# Patient Record
Sex: FEMALE | Race: WHITE | NOT HISPANIC OR LATINO | Employment: FULL TIME | URBAN - METROPOLITAN AREA
[De-identification: names, ages, dates, MRNs, and addresses within clinical notes are randomized per-mention and may not be internally consistent; named-entity substitution may affect disease eponyms.]

---

## 2017-03-06 ENCOUNTER — OFFICE VISIT (OUTPATIENT)
Dept: FAMILY MEDICINE CLINIC | Facility: CLINIC | Age: 27
End: 2017-03-06

## 2017-03-06 VITALS
WEIGHT: 158 LBS | SYSTOLIC BLOOD PRESSURE: 120 MMHG | HEIGHT: 64 IN | HEART RATE: 72 BPM | DIASTOLIC BLOOD PRESSURE: 82 MMHG | OXYGEN SATURATION: 99 % | BODY MASS INDEX: 26.98 KG/M2

## 2017-03-06 DIAGNOSIS — I15.9 SECONDARY HYPERTENSION: ICD-10-CM

## 2017-03-06 DIAGNOSIS — R00.2 PALPITATIONS: Primary | ICD-10-CM

## 2017-03-06 DIAGNOSIS — Z00.00 HEALTHCARE MAINTENANCE: ICD-10-CM

## 2017-03-06 PROCEDURE — 99213 OFFICE O/P EST LOW 20 MIN: CPT | Performed by: FAMILY MEDICINE

## 2017-03-06 RX ORDER — METOPROLOL SUCCINATE 50 MG/1
50 TABLET, EXTENDED RELEASE ORAL DAILY
Qty: 90 TABLET | Refills: 3 | Status: SHIPPED | OUTPATIENT
Start: 2017-03-06 | End: 2018-04-10 | Stop reason: SDUPTHER

## 2017-03-06 NOTE — PROGRESS NOTES
Subjective   Page Austin is a 26 y.o. female.     Chief Complaint   Patient presents with   • Hypertension   • Med Refill     Social History   Substance Use Topics   • Smoking status: Never Smoker   • Smokeless tobacco: None   • Alcohol use Yes      Comment: 2/WEEK       History of Present Illness     Patient is here for follow-up of hypertension. She is not exercising and is adherent to a low-salt diet. Patient does check  her blood pressure on occ. It is well controlled . Patient denies chest pain and dyspnea. Cardiovascular risk factors: hypertension. Use of agents associated with hypertension: none. History of target organ damage: none. She is compliant with meds.      The following portions of the patient's history were reviewed and updated as appropriate: allergies, current medications, past social history and problem list.     Review of Systems   Constitutional: Negative for activity change, appetite change, chills, fatigue, fever and unexpected weight change.   HENT: Negative for congestion, ear pain, hearing loss, nosebleeds, rhinorrhea and sore throat.    Eyes: Negative for pain, redness and visual disturbance.   Respiratory: Negative for cough, shortness of breath and wheezing.    Cardiovascular: Negative for chest pain, palpitations and leg swelling.   Gastrointestinal: Negative for abdominal pain, blood in stool, constipation, diarrhea, nausea and vomiting.   Endocrine: Negative for cold intolerance and heat intolerance.   Genitourinary: Negative for difficulty urinating, dysuria, frequency, hematuria, pelvic pain, urgency and vaginal discharge.   Musculoskeletal: Negative for arthralgias, back pain and joint swelling.   Skin: Negative for rash and wound.   Neurological: Positive for headaches. Negative for dizziness, weakness and numbness.   Hematological: Does not bruise/bleed easily.   Psychiatric/Behavioral: Negative for dysphoric mood, sleep disturbance and suicidal ideas. The patient is not  "nervous/anxious.        Objective   Vitals:    03/06/17 1437   BP: 120/82   Pulse: 72   SpO2: 99%   Weight: 158 lb (71.7 kg)   Height: 64\" (162.6 cm)     Body mass index is 27.12 kg/(m^2).    Physical Exam   Constitutional: She is oriented to person, place, and time. Vital signs are normal. She appears well-developed and well-nourished. No distress.   HENT:   Head: Normocephalic.   Cardiovascular: Normal rate, regular rhythm and normal heart sounds.    Pulmonary/Chest: Effort normal and breath sounds normal.   Neurological: She is alert and oriented to person, place, and time. Gait normal.   Psychiatric: She has a normal mood and affect. Her behavior is normal. Judgment and thought content normal.   Vitals reviewed.      Assessment/Plan   Problem List Items Addressed This Visit        Cardiovascular and Mediastinum    Palpitations - Primary    Relevant Medications    metoprolol succinate XL (TOPROL-XL) 50 MG 24 hr tablet    Secondary hypertension    Relevant Medications    metoprolol succinate XL (TOPROL-XL) 50 MG 24 hr tablet      Other Visit Diagnoses     Healthcare maintenance        Relevant Orders    Comprehensive metabolic panel    Lipid Panel With LDL/HDL Ratio    CBC and Differential           "

## 2017-03-07 LAB
ALBUMIN SERPL-MCNC: 4 G/DL (ref 3.5–5.5)
ALBUMIN/GLOB SERPL: 1.5 {RATIO} (ref 1.1–2.5)
ALP SERPL-CCNC: 48 IU/L (ref 39–117)
ALT SERPL-CCNC: 19 IU/L (ref 0–32)
AST SERPL-CCNC: 17 IU/L (ref 0–40)
BASOPHILS # BLD AUTO: 0 X10E3/UL (ref 0–0.2)
BASOPHILS NFR BLD AUTO: 0 %
BILIRUB SERPL-MCNC: <0.2 MG/DL (ref 0–1.2)
BUN SERPL-MCNC: 12 MG/DL (ref 6–20)
BUN/CREAT SERPL: 15 (ref 8–20)
CALCIUM SERPL-MCNC: 8.9 MG/DL (ref 8.7–10.2)
CHLORIDE SERPL-SCNC: 103 MMOL/L (ref 96–106)
CHOLEST SERPL-MCNC: 166 MG/DL (ref 100–199)
CO2 SERPL-SCNC: 24 MMOL/L (ref 18–29)
CREAT SERPL-MCNC: 0.81 MG/DL (ref 0.57–1)
EOSINOPHIL # BLD AUTO: 0.2 X10E3/UL (ref 0–0.4)
EOSINOPHIL NFR BLD AUTO: 3 %
ERYTHROCYTE [DISTWIDTH] IN BLOOD BY AUTOMATED COUNT: 13.8 % (ref 12.3–15.4)
GLOBULIN SER CALC-MCNC: 2.7 G/DL (ref 1.5–4.5)
GLUCOSE SERPL-MCNC: 98 MG/DL (ref 65–99)
HCT VFR BLD AUTO: 38.7 % (ref 34–46.6)
HDLC SERPL-MCNC: 43 MG/DL
HGB BLD-MCNC: 12.5 G/DL (ref 11.1–15.9)
IMM GRANULOCYTES # BLD: 0 X10E3/UL (ref 0–0.1)
IMM GRANULOCYTES NFR BLD: 0 %
LDLC SERPL CALC-MCNC: 100 MG/DL (ref 0–99)
LDLC/HDLC SERPL: 2.3 RATIO UNITS (ref 0–3.2)
LYMPHOCYTES # BLD AUTO: 2.6 X10E3/UL (ref 0.7–3.1)
LYMPHOCYTES NFR BLD AUTO: 34 %
MCH RBC QN AUTO: 26.4 PG (ref 26.6–33)
MCHC RBC AUTO-ENTMCNC: 32.3 G/DL (ref 31.5–35.7)
MCV RBC AUTO: 82 FL (ref 79–97)
MONOCYTES # BLD AUTO: 0.7 X10E3/UL (ref 0.1–0.9)
MONOCYTES NFR BLD AUTO: 9 %
NEUTROPHILS # BLD AUTO: 4.1 X10E3/UL (ref 1.4–7)
NEUTROPHILS NFR BLD AUTO: 54 %
PLATELET # BLD AUTO: 281 X10E3/UL (ref 150–379)
POTASSIUM SERPL-SCNC: 4.3 MMOL/L (ref 3.5–5.2)
PROT SERPL-MCNC: 6.7 G/DL (ref 6–8.5)
RBC # BLD AUTO: 4.74 X10E6/UL (ref 3.77–5.28)
SODIUM SERPL-SCNC: 140 MMOL/L (ref 134–144)
TRIGL SERPL-MCNC: 114 MG/DL (ref 0–149)
VLDLC SERPL CALC-MCNC: 23 MG/DL (ref 5–40)
WBC # BLD AUTO: 7.7 X10E3/UL (ref 3.4–10.8)

## 2018-01-25 ENCOUNTER — TELEPHONE (OUTPATIENT)
Dept: FAMILY MEDICINE CLINIC | Facility: CLINIC | Age: 28
End: 2018-01-25

## 2018-01-25 NOTE — TELEPHONE ENCOUNTER
----- Message from Susana Ortiz MA sent at 1/25/2018  2:21 PM EST -----  Chioma Ocampo took this message I will find out what happened.   ----- Message -----     From: Bella Izaguirre     Sent: 1/25/2018  12:58 PM       To: Susana Gold MA    Patient left message last week re:  Possible conflict with blood pressure medication that she is on (toprol) and medication from dermatologist- Spironolactone    Please call [phone]

## 2018-01-25 NOTE — TELEPHONE ENCOUNTER
Patient contacted and made aware that I would ask Dr. Marroquin tomorrow when she gets back into the office. I apologized for not returning her call Friday when she called.     Patient stated that she was placed on Spironolactone by her dermatologist. She wants to make sure that this will not interact with her current medication.

## 2018-04-10 DIAGNOSIS — I15.9 SECONDARY HYPERTENSION: ICD-10-CM

## 2018-04-10 DIAGNOSIS — R00.2 PALPITATIONS: ICD-10-CM

## 2018-04-10 RX ORDER — METOPROLOL SUCCINATE 50 MG/1
50 TABLET, EXTENDED RELEASE ORAL DAILY
Qty: 30 TABLET | Refills: 0 | Status: SHIPPED | OUTPATIENT
Start: 2018-04-10 | End: 2018-05-08 | Stop reason: SDUPTHER

## 2018-05-08 DIAGNOSIS — I15.9 SECONDARY HYPERTENSION: ICD-10-CM

## 2018-05-08 DIAGNOSIS — R00.2 PALPITATIONS: ICD-10-CM

## 2018-05-08 RX ORDER — METOPROLOL SUCCINATE 50 MG/1
50 TABLET, EXTENDED RELEASE ORAL DAILY
Qty: 15 TABLET | Refills: 0 | Status: SHIPPED | OUTPATIENT
Start: 2018-05-08 | End: 2018-05-18 | Stop reason: SDUPTHER

## 2018-05-18 ENCOUNTER — OFFICE VISIT (OUTPATIENT)
Dept: FAMILY MEDICINE CLINIC | Facility: CLINIC | Age: 28
End: 2018-05-18

## 2018-05-18 VITALS
HEART RATE: 65 BPM | HEIGHT: 64 IN | BODY MASS INDEX: 26.8 KG/M2 | RESPIRATION RATE: 16 BRPM | WEIGHT: 157 LBS | DIASTOLIC BLOOD PRESSURE: 78 MMHG | SYSTOLIC BLOOD PRESSURE: 114 MMHG | OXYGEN SATURATION: 100 %

## 2018-05-18 DIAGNOSIS — R00.2 PALPITATIONS: ICD-10-CM

## 2018-05-18 DIAGNOSIS — I15.9 SECONDARY HYPERTENSION: ICD-10-CM

## 2018-05-18 PROCEDURE — 99212 OFFICE O/P EST SF 10 MIN: CPT | Performed by: FAMILY MEDICINE

## 2018-05-18 RX ORDER — METOPROLOL SUCCINATE 50 MG/1
50 TABLET, EXTENDED RELEASE ORAL DAILY
Qty: 90 TABLET | Refills: 3 | Status: SHIPPED | OUTPATIENT
Start: 2018-05-18 | End: 2019-05-10 | Stop reason: SDUPTHER

## 2018-05-18 NOTE — PROGRESS NOTES
Problem List Items Addressed This Visit        Cardiovascular and Mediastinum    Palpitations    Current Assessment & Plan     [facility] 5/18/2018  She never feels palpitations any more.          Relevant Medications    metoprolol succinate XL (TOPROL-XL) 50 MG 24 hr tablet    Secondary hypertension    Current Assessment & Plan     [facility]  BP is controlled well. She will recheck in six months. She will continue present meds.            Relevant Medications    metoprolol succinate XL (TOPROL-XL) 50 MG 24 hr tablet             No Follow-up on file.  There are no Patient Instructions on file for this visit.  Page Austin is a 28 y.o. female being seen in our office today for Hypertension                 She  reports that she has never smoked. She has never used smokeless tobacco. She reports that she drinks alcohol. She reports that she does not use drugs.             HPI Patient is here for follow-up of hypertension. She is exercising and is not adherent to a low-salt diet. Patient does check BP occasionally.. Patient denies chest pain and dyspnea. Cardiovascular risk factors: hypertension. Use of agents associated with hypertension: none. History of target organ damage: none. She is compliant with meds.              The following portions of the patient's history were reviewed and updated as appropriate:PMHroutine: Social history , Allergies, Current Medications, Active Problem List and Health Maintenance            Review of Systems   Constitutional: Negative for activity change, appetite change, chills, fatigue, fever and unexpected weight change.   HENT: Negative for congestion, ear pain, hearing loss, nosebleeds, rhinorrhea and sore throat.    Eyes: Negative for pain, redness and visual disturbance.   Respiratory: Negative for cough, shortness of breath and wheezing.    Cardiovascular: Negative for chest pain, palpitations and leg swelling.   Gastrointestinal: Negative for abdominal pain, blood  in stool, constipation, diarrhea, nausea and vomiting.   Endocrine: Negative for cold intolerance and heat intolerance.   Genitourinary: Negative for difficulty urinating, dysuria, frequency, hematuria, pelvic pain, urgency and vaginal discharge.   Musculoskeletal: Negative for arthralgias, back pain and joint swelling.   Skin: Negative for rash and wound.   Neurological: Negative for dizziness, weakness, numbness and headaches.   Hematological: Does not bruise/bleed easily.   Psychiatric/Behavioral: Negative for dysphoric mood, sleep disturbance and suicidal ideas. The patient is not nervous/anxious.                  BP Readings from Last 1 Encounters:   05/18/18 114/78     Wt Readings from Last 3 Encounters:   05/18/18 71.2 kg (157 lb)   10/27/17 72.6 kg (160 lb)   03/06/17 71.7 kg (158 lb)   Body mass index is 26.95 kg/m².                 Physical Exam   Constitutional: She is oriented to person, place, and time. Vital signs are normal. She appears well-developed and well-nourished. No distress.   HENT:   Head: Normocephalic.   Cardiovascular: Normal rate, regular rhythm and normal heart sounds.    Pulmonary/Chest: Effort normal and breath sounds normal.   Neurological: She is alert and oriented to person, place, and time. Gait normal.   Psychiatric: She has a normal mood and affect. Her behavior is normal. Judgment and thought content normal.   Vitals reviewed.

## 2018-05-18 NOTE — ASSESSMENT & PLAN NOTE
[facility] 5/18/2018  BP is controlled well. She will recheck in six months. She will continue present meds.

## 2019-05-10 ENCOUNTER — TELEPHONE (OUTPATIENT)
Dept: FAMILY MEDICINE CLINIC | Facility: CLINIC | Age: 29
End: 2019-05-10

## 2019-05-10 DIAGNOSIS — R00.2 PALPITATIONS: ICD-10-CM

## 2019-05-10 DIAGNOSIS — I15.9 SECONDARY HYPERTENSION: ICD-10-CM

## 2019-05-10 RX ORDER — METOPROLOL SUCCINATE 50 MG/1
TABLET, EXTENDED RELEASE ORAL
Qty: 30 TABLET | Refills: 0 | Status: SHIPPED | OUTPATIENT
Start: 2019-05-10 | End: 2019-05-20 | Stop reason: SDUPTHER

## 2019-05-20 ENCOUNTER — OFFICE VISIT (OUTPATIENT)
Dept: FAMILY MEDICINE CLINIC | Facility: CLINIC | Age: 29
End: 2019-05-20

## 2019-05-20 VITALS
HEART RATE: 74 BPM | SYSTOLIC BLOOD PRESSURE: 110 MMHG | HEIGHT: 64 IN | DIASTOLIC BLOOD PRESSURE: 70 MMHG | WEIGHT: 150.2 LBS | OXYGEN SATURATION: 99 % | BODY MASS INDEX: 25.64 KG/M2 | RESPIRATION RATE: 14 BRPM

## 2019-05-20 DIAGNOSIS — R00.2 PALPITATIONS: ICD-10-CM

## 2019-05-20 DIAGNOSIS — I15.9 SECONDARY HYPERTENSION: ICD-10-CM

## 2019-05-20 PROCEDURE — 99213 OFFICE O/P EST LOW 20 MIN: CPT | Performed by: FAMILY MEDICINE

## 2019-05-20 RX ORDER — METOPROLOL SUCCINATE 50 MG/1
50 TABLET, EXTENDED RELEASE ORAL DAILY
Qty: 90 TABLET | Refills: 1 | Status: SHIPPED | OUTPATIENT
Start: 2019-05-20 | End: 2019-12-15 | Stop reason: SDUPTHER

## 2019-05-20 NOTE — PROGRESS NOTES
Assessment and Plan  Problem List Items Addressed This Visit        Cardiovascular and Mediastinum    Palpitations    Relevant Medications    metoprolol succinate XL (TOPROL-XL) 50 MG 24 hr tablet      Other Visit Diagnoses     Secondary hypertension        Relevant Medications    metoprolol succinate XL (TOPROL-XL) 50 MG 24 hr tablet        F/U and Patient Instructions    Return in about 6 months (around 11/20/2019).      Subjective    Page Austin is a 29 y.o. female being seen in our office today for Hypertension     Patient History              Social History  She  reports that she has never smoked. She has never used smokeless tobacco. She reports that she drinks alcohol. She reports that she does not use drugs.             History of the Present Illness  HPI HPI Patient is here for follow-up of hypertension. She is exercising and is not adherent to a low-salt diet. Patient does check BP occasionally. Patient denies chest pain and dyspnea. Cardiovascular risk factors: hypertension. Use of agents associated with hypertension: none. History of target organ damage: none. She is compliant with meds. Her OBGYN wondered if the nuvaring was causing her issues with the BP. She has a strong FH of HTN and she has responded well to the metoprolol. I don't feel she needs to come of the nuva ring.   Significant Past History  The following portions of the patient's history were reviewed and updated as appropriate:PMHroutine: Social history , Allergies, Current Medications, Active Problem List and Health Maintenance          Review of Symptoms  Review of Systems   Constitutional: Negative for activity change, appetite change, chills, fatigue, fever and unexpected weight change.   HENT: Negative for congestion, ear pain, hearing loss, nosebleeds, rhinorrhea and sore throat.    Eyes: Negative for pain, redness and visual disturbance.   Respiratory: Negative for cough, shortness of breath and wheezing.    Cardiovascular:  Negative for chest pain, palpitations and leg swelling.   Gastrointestinal: Negative for abdominal pain, blood in stool, constipation, diarrhea, nausea and vomiting.   Endocrine: Negative for cold intolerance and heat intolerance.   Genitourinary: Negative for difficulty urinating, dysuria, frequency, hematuria, pelvic pain, urgency and vaginal discharge.   Musculoskeletal: Negative for arthralgias, back pain and joint swelling.   Skin: Negative for rash and wound.   Neurological: Negative for dizziness, weakness, numbness and headaches.   Hematological: Does not bruise/bleed easily.   Psychiatric/Behavioral: Negative for dysphoric mood, sleep disturbance and suicidal ideas. The patient is not nervous/anxious.      Objective  Vital Signs         BP Readings from Last 1 Encounters:   05/20/19 110/70     Wt Readings from Last 3 Encounters:   05/20/19 68.1 kg (150 lb 3.2 oz)   10/29/18 71.7 kg (158 lb)   05/18/18 71.2 kg (157 lb)   Body mass index is 25.78 kg/m².          Physical Exam   Physical Exam   Constitutional: She is oriented to person, place, and time. Vital signs are normal. She appears well-developed and well-nourished. No distress.   HENT:   Head: Normocephalic.   Cardiovascular: Normal rate, regular rhythm and normal heart sounds.   Pulmonary/Chest: Effort normal and breath sounds normal.   Neurological: She is alert and oriented to person, place, and time. Gait normal.   Psychiatric: She has a normal mood and affect. Her behavior is normal. Judgment and thought content normal.   Vitals reviewed.

## 2019-12-15 DIAGNOSIS — R00.2 PALPITATIONS: ICD-10-CM

## 2019-12-15 DIAGNOSIS — I15.9 SECONDARY HYPERTENSION: ICD-10-CM

## 2019-12-16 RX ORDER — METOPROLOL SUCCINATE 50 MG/1
TABLET, EXTENDED RELEASE ORAL
Qty: 30 TABLET | Refills: 0 | Status: SHIPPED | OUTPATIENT
Start: 2019-12-16 | End: 2019-12-30

## 2019-12-29 DIAGNOSIS — R00.2 PALPITATIONS: ICD-10-CM

## 2019-12-29 DIAGNOSIS — I15.9 SECONDARY HYPERTENSION: ICD-10-CM

## 2019-12-30 RX ORDER — METOPROLOL SUCCINATE 50 MG/1
TABLET, EXTENDED RELEASE ORAL
Qty: 90 TABLET | Refills: 0 | Status: SHIPPED | OUTPATIENT
Start: 2019-12-30 | End: 2020-02-26 | Stop reason: SDUPTHER

## 2020-01-06 ENCOUNTER — OFFICE VISIT (OUTPATIENT)
Dept: FAMILY MEDICINE CLINIC | Facility: CLINIC | Age: 30
End: 2020-01-06

## 2020-01-06 VITALS
HEART RATE: 70 BPM | RESPIRATION RATE: 14 BRPM | OXYGEN SATURATION: 96 % | SYSTOLIC BLOOD PRESSURE: 128 MMHG | DIASTOLIC BLOOD PRESSURE: 78 MMHG | BODY MASS INDEX: 25.9 KG/M2 | WEIGHT: 151.7 LBS | HEIGHT: 64 IN

## 2020-01-06 DIAGNOSIS — F41.9 ANXIETY: Primary | ICD-10-CM

## 2020-01-06 DIAGNOSIS — J39.2 THROAT DRY: ICD-10-CM

## 2020-01-06 PROCEDURE — 99213 OFFICE O/P EST LOW 20 MIN: CPT | Performed by: FAMILY MEDICINE

## 2020-01-06 NOTE — PROGRESS NOTES
ASSESSMENT AND PLAN    Problem List Items Addressed This Visit     None      Visit Diagnoses     Anxiety    -  Primary    Referral info given for CBT    Throat dry        Prob mechanical. Recommend increasing hydration. Let me know if persists.              Return for Next scheduled follow up for routine problems.  Patient was given instructions and counseling regarding her condition or for health maintenance advice. Please see specific information pulled into the AVS by me.          SUBJECTIVE  Page Austin is a 29 y.o. female being seen in our office today for Anxiety               Social History  She  reports that she has never smoked. She has never used smokeless tobacco. She reports that she drinks alcohol. She reports that she does not use drugs.    History of the Present Illness   HPI Anxiety: Patient complains of anxiety disorder.  She has the following symptoms: palpitations, racing thoughts, super organized but has trouble when she can't control the situation. . Onset of symptoms was most of the time    gradually worsening since that time. She denies current suicidal and homicidal ideation. Family history significant for anxiety.Possible organic causes contributing are: none. Risk factors: none Previous treatment includes none and none.  She complains of the following side effects from the treatment: none and NA.    Click sensation on right side of neck. Prob re to dryness.      Significant Past History  The following portions of the patient's history were reviewed and updated as appropriate:PMHroutine: Social history , Allergies, Current Medications, Active Problem List and Health Maintenance    Review of Systems   Constitutional: Negative for activity change, appetite change, chills, fatigue, fever and unexpected weight change.   HENT: Negative for congestion, ear pain, hearing loss, nosebleeds, rhinorrhea and sore throat.    Eyes: Negative for pain, redness and visual disturbance.    Respiratory: Negative for cough, shortness of breath and wheezing.    Cardiovascular: Negative for chest pain, palpitations and leg swelling.   Gastrointestinal: Negative for abdominal pain, blood in stool, constipation, diarrhea, nausea and vomiting.   Endocrine: Negative for cold intolerance and heat intolerance.   Genitourinary: Negative for difficulty urinating, dysuria, frequency, hematuria, pelvic pain, urgency and vaginal discharge.   Musculoskeletal: Negative for arthralgias, back pain and joint swelling.   Skin: Negative for rash and wound.   Neurological: Negative for dizziness, weakness, numbness and headaches.   Hematological: Does not bruise/bleed easily.   Psychiatric/Behavioral: Negative for dysphoric mood, sleep disturbance and suicidal ideas. The patient is nervous/anxious.    I have reviewed the ROS as documented by the MA. Lidia Marroquin MD      OBJECTIVE   Vital Signs          BP Readings from Last 1 Encounters:   01/06/20 128/78     Wt Readings from Last 3 Encounters:   01/06/20 68.8 kg (151 lb 11.2 oz)   05/20/19 68.1 kg (150 lb 3.2 oz)   10/29/18 71.7 kg (158 lb)   Body mass index is 26.04 kg/m².     Physical Exam   Constitutional: She appears well-developed and well-nourished.   HENT:   Mouth/Throat: Uvula is midline, oropharynx is clear and moist and mucous membranes are normal.   Could feel a vibratory click on right but no adenopathy. No pain. Is really dry.    Psychiatric: She has a normal mood and affect. Her behavior is normal. Judgment and thought content normal.   Vitals reviewed.    Data Reviewed

## 2020-02-26 DIAGNOSIS — I15.9 SECONDARY HYPERTENSION: ICD-10-CM

## 2020-02-26 DIAGNOSIS — R00.2 PALPITATIONS: ICD-10-CM

## 2020-02-26 RX ORDER — METOPROLOL SUCCINATE 50 MG/1
50 TABLET, EXTENDED RELEASE ORAL DAILY
Qty: 90 TABLET | Refills: 1 | Status: SHIPPED | OUTPATIENT
Start: 2020-02-26 | End: 2020-09-09

## 2020-02-26 NOTE — TELEPHONE ENCOUNTER
PT CALLED TO GET MED REFILLS OF metoprolol succinate XL (TOPROL-XL) 50 MG 24 hr tablet [[id number]]    PHARMACY IS [address]    CB#: [phone]

## 2020-09-08 DIAGNOSIS — I15.9 SECONDARY HYPERTENSION: ICD-10-CM

## 2020-09-08 DIAGNOSIS — R00.2 PALPITATIONS: ICD-10-CM

## 2020-09-09 RX ORDER — METOPROLOL SUCCINATE 50 MG/1
TABLET, EXTENDED RELEASE ORAL
Qty: 90 TABLET | Refills: 0 | Status: SHIPPED | OUTPATIENT
Start: 2020-09-09 | End: 2020-11-30

## 2020-11-29 DIAGNOSIS — R00.2 PALPITATIONS: ICD-10-CM

## 2020-11-29 DIAGNOSIS — I15.9 SECONDARY HYPERTENSION: ICD-10-CM

## 2020-11-30 RX ORDER — METOPROLOL SUCCINATE 50 MG/1
TABLET, EXTENDED RELEASE ORAL
Qty: 30 TABLET | Refills: 0 | Status: SHIPPED | OUTPATIENT
Start: 2020-11-30 | End: 2020-12-18 | Stop reason: SDUPTHER

## 2020-12-18 ENCOUNTER — OFFICE VISIT (OUTPATIENT)
Dept: FAMILY MEDICINE CLINIC | Facility: CLINIC | Age: 30
End: 2020-12-18

## 2020-12-18 VITALS
HEIGHT: 64 IN | BODY MASS INDEX: 26.63 KG/M2 | WEIGHT: 156 LBS | SYSTOLIC BLOOD PRESSURE: 106 MMHG | RESPIRATION RATE: 14 BRPM | DIASTOLIC BLOOD PRESSURE: 70 MMHG

## 2020-12-18 DIAGNOSIS — R00.2 PALPITATIONS: ICD-10-CM

## 2020-12-18 DIAGNOSIS — I15.9 SECONDARY HYPERTENSION: ICD-10-CM

## 2020-12-18 PROCEDURE — 99395 PREV VISIT EST AGE 18-39: CPT | Performed by: FAMILY MEDICINE

## 2020-12-18 RX ORDER — TRIAMCINOLONE ACETONIDE 1 MG/G
CREAM TOPICAL
COMMUNITY
Start: 2020-11-10 | End: 2020-12-18

## 2020-12-18 RX ORDER — METOPROLOL SUCCINATE 50 MG/1
50 TABLET, EXTENDED RELEASE ORAL DAILY
Qty: 90 TABLET | Refills: 1 | Status: SHIPPED | OUTPATIENT
Start: 2020-12-18 | End: 2021-06-22

## 2020-12-18 RX ORDER — NORETHINDRONE 0.35 MG/1
1 TABLET ORAL DAILY
COMMUNITY
Start: 2020-11-29

## 2020-12-18 NOTE — PROGRESS NOTES
Assessment and Plan:     Problem List Items Addressed This Visit     Palpitations    Relevant Medications    metoprolol succinate XL (TOPROL-XL) 50 MG 24 hr tablet      Other Visit Diagnoses     Secondary hypertension        Relevant Medications    metoprolol succinate XL (TOPROL-XL) 50 MG 24 hr tablet        Return in about 1 year (around 12/18/2021) for lab with next visit, Annual physical.  Patient was given instructions and counseling regarding her condition or for health maintenance advice. Please see specific information pulled into the AVS if appropriate.        Page Austin is a 30 y.o. female being seen today for Annual Exam   Subjective   History of the Present Illness   Annual Exam-Premenopausal:    Page Austin 30 y.o.female presents for annual exam.    Health Habits:  Dental Exam. up to date  Exercise: 5 times/week.  Current exercise activities include: yoga and BARRE  She is eating a healthy diet.   The patient does wear seatbelts.  She is wearing sunscreen.  Practicing social distancing, handwashing and keeping hands from face? yes  Last pap  approximate date 2019 and was normal   BCM -- OCP  Vaccines up to date  Labs results were within the five years three years ago   Social History  She  reports that she has never smoked. She has never used smokeless tobacco. She reports current alcohol use. She reports that she does not use drugs.  Review of Systems   Constitutional: Negative for activity change, appetite change, chills, fatigue, fever and unexpected weight change.   HENT: Negative for congestion, ear pain, hearing loss, nosebleeds, rhinorrhea and sore throat.    Eyes: Negative for pain, redness and visual disturbance.   Respiratory: Negative for cough, shortness of breath and wheezing.    Cardiovascular: Negative for chest pain, palpitations and leg swelling.   Gastrointestinal: Negative for abdominal pain, blood in stool, constipation, diarrhea, nausea and vomiting.   Endocrine: Negative  for cold intolerance and heat intolerance.   Genitourinary: Negative for difficulty urinating, dysuria, frequency, hematuria, pelvic pain, urgency and vaginal discharge.   Musculoskeletal: Negative for arthralgias, back pain and joint swelling.   Skin: Negative for rash and wound.   Neurological: Negative for dizziness, weakness, numbness and headaches.   Hematological: Does not bruise/bleed easily.   Psychiatric/Behavioral: Negative for dysphoric mood, sleep disturbance and suicidal ideas. The patient is not nervous/anxious.    I have reviewed the ROS as documented by the MA. Lidia Marroquin MD    Objective   Vital Signs          BP Readings from Last 1 Encounters:   12/18/20 106/70     Wt Readings from Last 3 Encounters:   12/18/20 70.8 kg (156 lb)   02/20/20 70.3 kg (155 lb)   01/06/20 68.8 kg (151 lb 11.2 oz)   Body mass index is 26.78 kg/m².     Physical Exam  Vitals signs reviewed.   Constitutional:       General: She is not in acute distress.     Appearance: She is well-developed.   HENT:      Head: Normocephalic and atraumatic.      Right Ear: Tympanic membrane, ear canal and external ear normal.      Left Ear: Tympanic membrane, ear canal and external ear normal.      Mouth/Throat:      Comments: Mask in place  Eyes:      Conjunctiva/sclera: Conjunctivae normal.   Neck:      Musculoskeletal: Normal range of motion and neck supple.      Thyroid: No thyromegaly.      Trachea: No tracheal deviation.   Cardiovascular:      Rate and Rhythm: Normal rate and regular rhythm.      Heart sounds: Normal heart sounds.   Pulmonary:      Effort: Pulmonary effort is normal. No respiratory distress.      Breath sounds: Normal breath sounds. No wheezing or rales.   Chest:      Breasts: Breasts are symmetrical.         Right: No mass.         Left: No mass.   Abdominal:      General: Bowel sounds are normal. There is no distension.      Palpations: Abdomen is soft.      Tenderness: There is no abdominal tenderness.    Musculoskeletal:         General: No deformity.      Right lower leg: No edema.      Left lower leg: No edema.   Lymphadenopathy:      Cervical: No cervical adenopathy.   Skin:     General: Skin is warm and dry.   Neurological:      Mental Status: She is alert and oriented to person, place, and time.   Psychiatric:         Behavior: Behavior normal.         Thought Content: Thought content normal.         Judgment: Judgment normal.

## 2021-04-16 ENCOUNTER — BULK ORDERING (OUTPATIENT)
Dept: CASE MANAGEMENT | Facility: OTHER | Age: 31
End: 2021-04-16

## 2021-04-16 DIAGNOSIS — Z23 IMMUNIZATION DUE: ICD-10-CM

## 2021-06-22 DIAGNOSIS — R00.2 PALPITATIONS: ICD-10-CM

## 2021-06-22 DIAGNOSIS — I15.9 SECONDARY HYPERTENSION: ICD-10-CM

## 2021-06-22 RX ORDER — METOPROLOL SUCCINATE 50 MG/1
TABLET, EXTENDED RELEASE ORAL
Qty: 30 TABLET | Refills: 0 | Status: SHIPPED | OUTPATIENT
Start: 2021-06-22 | End: 2021-07-15

## 2021-07-14 DIAGNOSIS — I15.9 SECONDARY HYPERTENSION: ICD-10-CM

## 2021-07-14 DIAGNOSIS — R00.2 PALPITATIONS: ICD-10-CM

## 2021-07-15 RX ORDER — METOPROLOL SUCCINATE 50 MG/1
TABLET, EXTENDED RELEASE ORAL
Qty: 30 TABLET | Refills: 0 | Status: SHIPPED | OUTPATIENT
Start: 2021-07-15 | End: 2021-07-19 | Stop reason: SDUPTHER

## 2021-07-19 DIAGNOSIS — R00.2 PALPITATIONS: ICD-10-CM

## 2021-07-19 DIAGNOSIS — I15.9 SECONDARY HYPERTENSION: ICD-10-CM

## 2021-07-19 NOTE — TELEPHONE ENCOUNTER
Caller: Page Austin    Relationship: Self    Best call back number: [phone] (H)    What is the best time to reach you: ANYTIME    Who are you requesting to speak with (clinical staff, provider,  specific staff member): CLINICAL STAFF    Do you know the name of the person who called:     What was the call regarding: BETA BLOCKER. PATIENT CALLED TO ADVISE THAT HER PHARMACY IS OUT OF HER BETA BLOCKER AND SHE IS REQUESTING A CALLBACK TO DISCUSS WHAT SHE SHOULD DO NEXT, SHE STATES THAT SHE CAN NOT JUST STOP THIS MEDICATION     Do you require a callback: YES        THANKS

## 2021-07-19 NOTE — TELEPHONE ENCOUNTER
Caller: Page Austin    Relationship: Self    Best call back number: [phone]    Medication needed:   Requested Prescriptions     Pending Prescriptions Disp Refills   • metoprolol succinate XL (TOPROL-XL) 50 MG 24 hr tablet 30 tablet 0     Sig: Take 1 tablet by mouth Daily.       When do you need the refill by: ASAP    What additional details did the patient provide when requesting the medication: PATIENT CALLED PHARMACY BACK. THEY STATED THEY HAVE THE MEDICINE, THEY JUST NEED A PRESCRIPTION FOR 90 DAYS IN ORDER FOR INSURANCE TO COVER.     Does the patient have less than a 3 day supply:  [x] Yes  [] No    What is the patient's preferred pharmacy:  [facility]/pharmacy #[id number] [place] [address] [phone] [facility] [phone]

## 2021-07-19 NOTE — TELEPHONE ENCOUNTER
Patient will call other pharmacy's to find one that has med and call back to let us know where to send

## 2021-07-20 RX ORDER — METOPROLOL SUCCINATE 50 MG/1
50 TABLET, EXTENDED RELEASE ORAL DAILY
Qty: 90 TABLET | Refills: 1 | Status: SHIPPED | OUTPATIENT
Start: 2021-07-20 | End: 2021-12-22 | Stop reason: SDUPTHER

## 2021-10-13 ENCOUNTER — FLU SHOT (OUTPATIENT)
Dept: FAMILY MEDICINE CLINIC | Facility: CLINIC | Age: 31
End: 2021-10-13

## 2021-10-13 DIAGNOSIS — Z23 NEED FOR INFLUENZA VACCINATION: Primary | ICD-10-CM

## 2021-10-13 PROCEDURE — 90686 IIV4 VACC NO PRSV 0.5 ML IM: CPT | Performed by: FAMILY MEDICINE

## 2021-10-13 PROCEDURE — 90471 IMMUNIZATION ADMIN: CPT | Performed by: FAMILY MEDICINE

## 2021-12-21 RX ORDER — SPIRONOLACTONE 25 MG/1
TABLET ORAL
COMMUNITY
Start: 2021-12-13 | End: 2022-12-30

## 2021-12-21 RX ORDER — CLINDAMYCIN PHOSPHATE AND BENZOYL PEROXIDE 10; 37.5 MG/G; MG/G
GEL TOPICAL
COMMUNITY
Start: 2021-09-17

## 2021-12-21 NOTE — PROGRESS NOTES
Assessment and Plan     Problem List Items Addressed This Visit        Cardiac and Vasculature    Palpitations    Relevant Medications    metoprolol succinate XL (TOPROL-XL) 50 MG 24 hr tablet      Other Visit Diagnoses     Healthcare maintenance    -  Primary    Relevant Orders    Hepatitis C Antibody    CBC (No Diff)    Comprehensive Metabolic Panel    Lipid Panel With LDL / HDL Ratio    Secondary hypertension        Relevant Medications    spironolactone (ALDACTONE) 25 MG tablet    metoprolol succinate XL (TOPROL-XL) 50 MG 24 hr tablet        No follow-ups on file.    Patient was given instructions and counseling regarding her condition or for health maintenance advice. Please see specific information pulled into the AVS if appropriate.        Page is a 31 y.o. being seen today for  Annual Exam   Subjective   History of the Present Illness   Annual Exam-Premenopausal:    Page Austin 31 y.o.female presents for annual exam.    Health Habits:  Dental Exam. up to date  Exercise: 4 times/week.  Current exercise activities include: beach  body, dance three times with weights  She is eating a healthy diet.   The patient does wear seatbelts.  She is wearing sunscreen.  Practicing social distancing, handwashing and keeping hands from face? yes  COVID vaccine UTD? yes  Last pap  approximate date 5/21 and was normal   BCM -- OCP  Vaccines up to date  Labs results were ordered  Social History  She  reports that she has never smoked. She has never used smokeless tobacco. She reports current alcohol use. She reports that she does not use drugs.  Objective   Vital Signs        BP Readings from Last 1 Encounters:   12/22/21 104/68     Wt Readings from Last 3 Encounters:   12/22/21 77.1 kg (170 lb)   12/18/20 70.8 kg (156 lb)   02/20/20 70.3 kg (155 lb)   Body mass index is 29.18 kg/m².     Physical Exam  Vitals reviewed.   Constitutional:       General: She is not in acute distress.     Appearance: She is well-developed.    HENT:      Head: Normocephalic and atraumatic.      Right Ear: Tympanic membrane, ear canal and external ear normal.      Left Ear: Tympanic membrane, ear canal and external ear normal.   Eyes:      Conjunctiva/sclera: Conjunctivae normal.   Neck:      Thyroid: No thyromegaly.      Trachea: No tracheal deviation.   Cardiovascular:      Rate and Rhythm: Normal rate and regular rhythm.      Heart sounds: Normal heart sounds.   Pulmonary:      Effort: Pulmonary effort is normal. No respiratory distress.      Breath sounds: Normal breath sounds. No wheezing or rales.   Abdominal:      General: Bowel sounds are normal. There is no distension.      Palpations: Abdomen is soft.      Tenderness: There is no abdominal tenderness.   Musculoskeletal:         General: No deformity.      Cervical back: Normal range of motion and neck supple.   Lymphadenopathy:      Cervical: No cervical adenopathy.   Skin:     General: Skin is warm and dry.   Neurological:      Mental Status: She is alert and oriented to person, place, and time.   Psychiatric:         Behavior: Behavior normal.         Thought Content: Thought content normal.         Judgment: Judgment normal.

## 2021-12-22 ENCOUNTER — OFFICE VISIT (OUTPATIENT)
Dept: FAMILY MEDICINE CLINIC | Facility: CLINIC | Age: 31
End: 2021-12-22

## 2021-12-22 VITALS
HEIGHT: 64 IN | HEART RATE: 72 BPM | SYSTOLIC BLOOD PRESSURE: 104 MMHG | WEIGHT: 170 LBS | OXYGEN SATURATION: 99 % | RESPIRATION RATE: 16 BRPM | DIASTOLIC BLOOD PRESSURE: 68 MMHG | BODY MASS INDEX: 29.02 KG/M2

## 2021-12-22 DIAGNOSIS — R00.2 PALPITATIONS: ICD-10-CM

## 2021-12-22 DIAGNOSIS — Z00.00 HEALTHCARE MAINTENANCE: Primary | ICD-10-CM

## 2021-12-22 DIAGNOSIS — I15.9 SECONDARY HYPERTENSION: ICD-10-CM

## 2021-12-22 PROCEDURE — 99395 PREV VISIT EST AGE 18-39: CPT | Performed by: FAMILY MEDICINE

## 2021-12-22 RX ORDER — METOPROLOL SUCCINATE 50 MG/1
50 TABLET, EXTENDED RELEASE ORAL DAILY
Qty: 90 TABLET | Refills: 3 | Status: SHIPPED | OUTPATIENT
Start: 2021-12-22 | End: 2022-12-30 | Stop reason: SDUPTHER

## 2021-12-22 RX ORDER — TRETINOIN 0.5 MG/G
CREAM TOPICAL
COMMUNITY
Start: 2021-07-02

## 2021-12-23 LAB
ALBUMIN SERPL-MCNC: 4.6 G/DL (ref 3.8–4.8)
ALBUMIN/GLOB SERPL: 2.1 {RATIO} (ref 1.2–2.2)
ALP SERPL-CCNC: 63 IU/L (ref 44–121)
ALT SERPL-CCNC: 16 IU/L (ref 0–32)
AST SERPL-CCNC: 15 IU/L (ref 0–40)
BILIRUB SERPL-MCNC: 0.2 MG/DL (ref 0–1.2)
BUN SERPL-MCNC: 12 MG/DL (ref 6–20)
BUN/CREAT SERPL: 15 (ref 9–23)
CALCIUM SERPL-MCNC: 9.7 MG/DL (ref 8.7–10.2)
CHLORIDE SERPL-SCNC: 102 MMOL/L (ref 96–106)
CHOLEST SERPL-MCNC: 174 MG/DL (ref 100–199)
CO2 SERPL-SCNC: 25 MMOL/L (ref 20–29)
CREAT SERPL-MCNC: 0.78 MG/DL (ref 0.57–1)
ERYTHROCYTE [DISTWIDTH] IN BLOOD BY AUTOMATED COUNT: 13 % (ref 11.7–15.4)
GLOBULIN SER CALC-MCNC: 2.2 G/DL (ref 1.5–4.5)
GLUCOSE SERPL-MCNC: 84 MG/DL (ref 65–99)
HCT VFR BLD AUTO: 42 % (ref 34–46.6)
HCV AB S/CO SERPL IA: <0.1 S/CO RATIO (ref 0–0.9)
HDLC SERPL-MCNC: 34 MG/DL
HGB BLD-MCNC: 14.3 G/DL (ref 11.1–15.9)
LDLC SERPL CALC-MCNC: 100 MG/DL (ref 0–99)
LDLC/HDLC SERPL: 2.9 RATIO (ref 0–3.2)
MCH RBC QN AUTO: 28.7 PG (ref 26.6–33)
MCHC RBC AUTO-ENTMCNC: 34 G/DL (ref 31.5–35.7)
MCV RBC AUTO: 84 FL (ref 79–97)
PLATELET # BLD AUTO: 300 X10E3/UL (ref 150–450)
POTASSIUM SERPL-SCNC: 4.5 MMOL/L (ref 3.5–5.2)
PROT SERPL-MCNC: 6.8 G/DL (ref 6–8.5)
RBC # BLD AUTO: 4.98 X10E6/UL (ref 3.77–5.28)
SODIUM SERPL-SCNC: 138 MMOL/L (ref 134–144)
TRIGL SERPL-MCNC: 230 MG/DL (ref 0–149)
VLDLC SERPL CALC-MCNC: 40 MG/DL (ref 5–40)
WBC # BLD AUTO: 8.2 X10E3/UL (ref 3.4–10.8)

## 2022-12-29 NOTE — PROGRESS NOTES
Assessment and Plan     Patient Instructions    Problem List Items Addressed This Visit        Cardiac and Vasculature    Benign essential hypertension       Endocrine and Metabolic    Class 1 obesity due to excess calories with body mass index (BMI) of 31.0 to 31.9 in adult    Current Assessment & Plan     Patient's (Body mass index is 31.07 kg/m².) indicates that they are obese (BMI >30) with health conditions that include hypertension . Weight is newly identified. BMI is is above average; BMI management plan is completed. We discussed portion control and increasing exercise. Also discussed reducing processed foods and interval training.         Other Visit Diagnoses     Healthcare maintenance    -  Primary             Return in about 6 months (around 6/30/2023).          Page is a 32 y.o. being seen today for  Annual Exam   Subjective   History of the Present Illness   Annual Exam-Premenopausal:    Page Austin 32 y.o.female presents for annual exam.    Health Habits:  Dental Exam. up to date  Are you exercising and if so how much every 2 days  She is eating a healthy diet.   The patient does wear seatbelts.  She is wearing sunscreen.  Last pap  approximate date 5/21 and was normal   BCM -- OCP  Vaccines up to date  This patient has ever been tested for HepC: yes  Labs results were ordered  Social History  She  reports that she has never smoked. She has never used smokeless tobacco. She reports current alcohol use of about 2.0 standard drinks per week. She reports that she does not use drugs.  Objective   Vital Signs        BP Readings from Last 1 Encounters:   12/30/22 106/62     Wt Readings from Last 3 Encounters:   12/30/22 82.1 kg (181 lb)   12/22/21 77.1 kg (170 lb)   12/18/20 70.8 kg (156 lb)   Body mass index is 31.07 kg/m².     Physical Exam  Vitals reviewed.   Constitutional:       Appearance: Normal appearance. She is well-developed and normal weight.      Comments: Mask in place     Cardiovascular:      Rate and Rhythm: Normal rate and regular rhythm.      Heart sounds: Normal heart sounds.   Pulmonary:      Effort: Pulmonary effort is normal.      Breath sounds: Normal breath sounds.   Neurological:      Mental Status: She is alert.   Psychiatric:         Behavior: Behavior normal.         Thought Content: Thought content normal.         Judgment: Judgment normal.               Patient was given instructions and counseling regarding her condition or for health maintenance advice. Please see specific information pulled into the AVS if appropriate.

## 2022-12-30 ENCOUNTER — OFFICE VISIT (OUTPATIENT)
Dept: FAMILY MEDICINE CLINIC | Facility: CLINIC | Age: 32
End: 2022-12-30

## 2022-12-30 VITALS
WEIGHT: 181 LBS | RESPIRATION RATE: 17 BRPM | OXYGEN SATURATION: 98 % | BODY MASS INDEX: 31.07 KG/M2 | HEART RATE: 70 BPM | SYSTOLIC BLOOD PRESSURE: 106 MMHG | DIASTOLIC BLOOD PRESSURE: 62 MMHG

## 2022-12-30 DIAGNOSIS — R00.2 PALPITATIONS: ICD-10-CM

## 2022-12-30 DIAGNOSIS — I10 BENIGN ESSENTIAL HYPERTENSION: ICD-10-CM

## 2022-12-30 DIAGNOSIS — I15.9 SECONDARY HYPERTENSION: ICD-10-CM

## 2022-12-30 DIAGNOSIS — E66.09 CLASS 1 OBESITY DUE TO EXCESS CALORIES WITHOUT SERIOUS COMORBIDITY WITH BODY MASS INDEX (BMI) OF 31.0 TO 31.9 IN ADULT: ICD-10-CM

## 2022-12-30 DIAGNOSIS — Z00.00 HEALTHCARE MAINTENANCE: Primary | ICD-10-CM

## 2022-12-30 PROBLEM — E66.811 CLASS 1 OBESITY DUE TO EXCESS CALORIES WITH BODY MASS INDEX (BMI) OF 31.0 TO 31.9 IN ADULT: Status: ACTIVE | Noted: 2022-12-30

## 2022-12-30 PROCEDURE — 99395 PREV VISIT EST AGE 18-39: CPT | Performed by: FAMILY MEDICINE

## 2022-12-30 RX ORDER — AZELAIC ACID 0.15 G/G
GEL TOPICAL
COMMUNITY
Start: 2022-11-28

## 2022-12-30 RX ORDER — METOPROLOL SUCCINATE 50 MG/1
TABLET, EXTENDED RELEASE ORAL
Qty: 90 TABLET | Refills: 1 | OUTPATIENT
Start: 2022-12-30

## 2022-12-30 RX ORDER — PNV NO.95/FERROUS FUM/FOLIC AC 28MG-0.8MG
TABLET ORAL
COMMUNITY
Start: 2022-06-28

## 2022-12-30 RX ORDER — METOPROLOL SUCCINATE 50 MG/1
50 TABLET, EXTENDED RELEASE ORAL DAILY
Qty: 90 TABLET | Refills: 3 | Status: SHIPPED | OUTPATIENT
Start: 2022-12-30

## 2022-12-30 NOTE — ASSESSMENT & PLAN NOTE
Patient's (Body mass index is 31.07 kg/m².) indicates that they are obese (BMI >30) with health conditions that include hypertension . Weight is newly identified. BMI is is above average; BMI management plan is completed. We discussed portion control and increasing exercise. Also discussed reducing processed foods and interval training.

## 2022-12-31 LAB
ALBUMIN SERPL-MCNC: 4.4 G/DL (ref 3.5–5.2)
ALBUMIN/GLOB SERPL: 1.8 G/DL
ALP SERPL-CCNC: 55 U/L (ref 39–117)
ALT SERPL-CCNC: 15 U/L (ref 1–33)
AST SERPL-CCNC: 13 U/L (ref 1–32)
BILIRUB SERPL-MCNC: 0.3 MG/DL (ref 0–1.2)
BUN SERPL-MCNC: 11 MG/DL (ref 6–20)
BUN/CREAT SERPL: 12.9 (ref 7–25)
CALCIUM SERPL-MCNC: 9.3 MG/DL (ref 8.6–10.5)
CHLORIDE SERPL-SCNC: 102 MMOL/L (ref 98–107)
CHOLEST SERPL-MCNC: 161 MG/DL (ref 0–200)
CO2 SERPL-SCNC: 29.6 MMOL/L (ref 22–29)
CREAT SERPL-MCNC: 0.85 MG/DL (ref 0.57–1)
EGFRCR SERPLBLD CKD-EPI 2021: 92.9 ML/MIN/1.73
ERYTHROCYTE [DISTWIDTH] IN BLOOD BY AUTOMATED COUNT: 12.7 % (ref 12.3–15.4)
GLOBULIN SER CALC-MCNC: 2.4 GM/DL
GLUCOSE SERPL-MCNC: 87 MG/DL (ref 65–99)
HCT VFR BLD AUTO: 42.6 % (ref 34–46.6)
HDLC SERPL-MCNC: 31 MG/DL (ref 40–60)
HGB BLD-MCNC: 14.2 G/DL (ref 12–15.9)
LDLC SERPL CALC-MCNC: 104 MG/DL (ref 0–100)
LDLC/HDLC SERPL: 3.26 {RATIO}
MCH RBC QN AUTO: 29 PG (ref 26.6–33)
MCHC RBC AUTO-ENTMCNC: 33.3 G/DL (ref 31.5–35.7)
MCV RBC AUTO: 86.9 FL (ref 79–97)
PLATELET # BLD AUTO: 235 10*3/MM3 (ref 140–450)
POTASSIUM SERPL-SCNC: 3.8 MMOL/L (ref 3.5–5.2)
PROT SERPL-MCNC: 6.8 G/DL (ref 6–8.5)
RBC # BLD AUTO: 4.9 10*6/MM3 (ref 3.77–5.28)
SODIUM SERPL-SCNC: 138 MMOL/L (ref 136–145)
TRIGL SERPL-MCNC: 144 MG/DL (ref 0–150)
TSH SERPL DL<=0.005 MIU/L-ACNC: 2.11 UIU/ML (ref 0.27–4.2)
VLDLC SERPL CALC-MCNC: 26 MG/DL (ref 5–40)
WBC # BLD AUTO: 7.07 10*3/MM3 (ref 3.4–10.8)

## 2023-12-16 DIAGNOSIS — R00.2 PALPITATIONS: ICD-10-CM

## 2023-12-16 DIAGNOSIS — I15.9 SECONDARY HYPERTENSION: ICD-10-CM

## 2023-12-18 RX ORDER — METOPROLOL SUCCINATE 50 MG/1
50 TABLET, EXTENDED RELEASE ORAL DAILY
Qty: 15 TABLET | Refills: 0 | Status: SHIPPED | OUTPATIENT
Start: 2023-12-18

## 2024-01-08 ENCOUNTER — OFFICE VISIT (OUTPATIENT)
Dept: FAMILY MEDICINE CLINIC | Facility: CLINIC | Age: 34
End: 2024-01-08
Payer: COMMERCIAL

## 2024-01-08 VITALS
WEIGHT: 180 LBS | HEART RATE: 60 BPM | RESPIRATION RATE: 18 BRPM | OXYGEN SATURATION: 99 % | BODY MASS INDEX: 30.73 KG/M2 | DIASTOLIC BLOOD PRESSURE: 78 MMHG | SYSTOLIC BLOOD PRESSURE: 128 MMHG | HEIGHT: 64 IN

## 2024-01-08 DIAGNOSIS — I10 BENIGN ESSENTIAL HYPERTENSION: Primary | ICD-10-CM

## 2024-01-08 DIAGNOSIS — R00.2 PALPITATIONS: ICD-10-CM

## 2024-01-08 PROCEDURE — 99213 OFFICE O/P EST LOW 20 MIN: CPT | Performed by: FAMILY MEDICINE

## 2024-01-08 RX ORDER — METOPROLOL SUCCINATE 50 MG/1
50 TABLET, EXTENDED RELEASE ORAL DAILY
Qty: 90 TABLET | Refills: 1 | Status: SHIPPED | OUTPATIENT
Start: 2024-01-08

## 2024-01-08 NOTE — PROGRESS NOTES
Assessment and Plan     Assessment & Plan  Benign essential hypertension  Bakari 1/8/2024  BP is controlled well. She will recheck in six months. She will continue present meds. Prescription is sent today. .    Palpitations  controlled     New Medications Ordered This Visit   Medications    metoprolol succinate XL (TOPROL-XL) 50 MG 24 hr tablet     Sig: Take 1 tablet by mouth Daily.     Dispense:  90 tablet     Refill:  1       Return for Annual physical.  Patient was given instructions and counseling regarding her condition or for health maintenance advice. Please see specific information pulled into the AVS if appropriate.         Page is a 33 y.o. being seen today for  Hypertension   HISTORY    HPI  Answers submitted by the patient for this visit:  Other (Submitted on 1/5/2024)  Please describe your symptoms.: None- prescription refill for hypertension and palpatations  Have you had these symptoms before?: No  How long have you been having these symptoms?: 1-4 days  Please list any medications you are currently taking for this condition.: Metoprolol    Social History  She  reports that she has never smoked. She has never used smokeless tobacco. She reports current alcohol use of about 2.0 standard drinks of alcohol per week. She reports that she does not use drugs.  EXAM DATA    Vital Signs        BP Readings from Last 1 Encounters:   01/08/24 128/78     Wt Readings from Last 3 Encounters:   01/08/24 81.6 kg (180 lb)   12/30/22 82.1 kg (181 lb)   12/22/21 77.1 kg (170 lb)   Body mass index is 30.9 kg/m².  Physical Exam  Vitals reviewed.   Constitutional:       Appearance: Normal appearance. She is well-developed.   Neurological:      Mental Status: She is alert.   Psychiatric:         Behavior: Behavior normal.         Thought Content: Thought content normal.         Judgment: Judgment normal.

## 2024-01-08 NOTE — ASSESSMENT & PLAN NOTE
Bakari 1/8/2024  BP is controlled well. She will recheck in six months. She will continue present meds. Prescription is sent today. .

## 2024-05-20 ENCOUNTER — OFFICE VISIT (OUTPATIENT)
Dept: FAMILY MEDICINE CLINIC | Facility: CLINIC | Age: 34
End: 2024-05-20
Payer: COMMERCIAL

## 2024-05-20 VITALS
RESPIRATION RATE: 18 BRPM | SYSTOLIC BLOOD PRESSURE: 140 MMHG | HEART RATE: 69 BPM | BODY MASS INDEX: 30.73 KG/M2 | OXYGEN SATURATION: 98 % | HEIGHT: 64 IN | WEIGHT: 180 LBS | DIASTOLIC BLOOD PRESSURE: 80 MMHG

## 2024-05-20 DIAGNOSIS — M54.50 ACUTE BILATERAL LOW BACK PAIN WITHOUT SCIATICA: ICD-10-CM

## 2024-05-20 DIAGNOSIS — R00.2 PALPITATIONS: ICD-10-CM

## 2024-05-20 DIAGNOSIS — Z00.00 HEALTHCARE MAINTENANCE: Primary | ICD-10-CM

## 2024-05-20 DIAGNOSIS — I10 BENIGN ESSENTIAL HYPERTENSION: ICD-10-CM

## 2024-05-20 PROCEDURE — 99395 PREV VISIT EST AGE 18-39: CPT | Performed by: FAMILY MEDICINE

## 2024-05-20 NOTE — ASSESSMENT & PLAN NOTE
BP is controlled well. She will recheck in six months. She will continue present meds. Prescription will be sent when notified by pharmacy..

## 2024-05-20 NOTE — PROGRESS NOTES
Assessment & Plan  Healthcare maintenance    Acute bilateral low back pain without sciatica    Benign essential hypertension  BP is controlled well. She will recheck in six months. She will continue present meds. Prescription will be sent when notified by pharmacy..  Palpitations  She will reduce espresso and monitor.     Orders Placed This Encounter   Procedures    CBC (No Diff)    Comprehensive Metabolic Panel    Lipid Panel With LDL / HDL Ratio    TSH    Ambulatory Referral to Physical Therapy for Evaluation & Treatment          Patient was given instructions and counseling regarding her condition or for health maintenance advice. Please see specific information pulled into the AVS if appropriate.          Page is a 34 y.o. being seen today for  Annual Exam   HISTORY    HPIAnnual Exam-Premenopausal:    Page Austin 34 y.o.female presents for annual exam.    Health Habits:  Dental Exam. up to date  Are you exercising and if so how much not currently  She is eating a healthy diet.   The patient does wear seatbelts.  She is wearing sunscreen.  Last pap  approximate date 10/15/23 and was normal   BCM -- none needed  Vaccines up to date  This patient has ever been tested for HepC : yes  Labs results were ordered    Hurt her back. Four weeks ago, better in the am. Walking but not exercising. Not radiation.     Having some palpitations. More lately. More espresso. Will try   Social History  She  reports that she has never smoked. She has never used smokeless tobacco. She reports current alcohol use of about 2.0 standard drinks of alcohol per week. She reports that she does not use drugs.  EXAM DATA    Vital Signs        BP Readings from Last 1 Encounters:   05/20/24 140/80     Wt Readings from Last 3 Encounters:   05/20/24 81.6 kg (180 lb)   01/08/24 81.6 kg (180 lb)   12/30/22 82.1 kg (181 lb)   Body mass index is 30.9 kg/m².  Physical Exam  Vitals reviewed.   Constitutional:       General: She is not in acute  distress.     Appearance: She is well-developed.   HENT:      Head: Normocephalic and atraumatic.      Right Ear: Tympanic membrane, ear canal and external ear normal.      Left Ear: Tympanic membrane, ear canal and external ear normal.   Eyes:      Conjunctiva/sclera: Conjunctivae normal.   Neck:      Thyroid: No thyromegaly.      Trachea: No tracheal deviation.   Cardiovascular:      Rate and Rhythm: Normal rate and regular rhythm.      Heart sounds: Normal heart sounds.   Pulmonary:      Effort: Pulmonary effort is normal. No respiratory distress.      Breath sounds: Normal breath sounds. No wheezing or rales.   Abdominal:      General: Bowel sounds are normal. There is no distension.      Palpations: Abdomen is soft.      Tenderness: There is no abdominal tenderness.   Musculoskeletal:         General: No deformity.      Cervical back: Normal range of motion and neck supple.   Lymphadenopathy:      Cervical: No cervical adenopathy.   Skin:     General: Skin is warm and dry.   Neurological:      Mental Status: She is alert and oriented to person, place, and time.   Psychiatric:         Behavior: Behavior normal.         Thought Content: Thought content normal.         Judgment: Judgment normal.       BMI is >= 30 and <35. (Class 1 Obesity). The following options were offered after discussion;: exercise counseling/recommendations                no

## 2024-05-20 NOTE — PATIENT INSTRUCTIONS
"Mediterranean diet: A heart-healthy eating plan  The heart-healthy Mediterranean diet is a healthy eating plan based on typical foods and recipes of Mediterranean-style cooking. Here's how to adopt the Mediterranean diet.  By [facility] Staff  If you're looking for a heart-healthy eating plan, the Mediterranean diet might be right for you.    The Mediterranean diet incorporates the basics of healthy eating -- plus a splash of flavorful olive oil and perhaps a glass of red wine -- among other components characterizing the traditional cooking style of countries bordering the [place].Most healthy diets include fruits, vegetables, fish and whole grains, and limit unhealthy fats. While these parts of a healthy diet are tried-and-true, subtle variations or differences in proportions of certain foods may make a difference in your risk of heart disease.    Benefits of the Mediterranean diet  Research has shown that the traditional Mediterranean diet reduces the risk of heart disease. The diet has been associated with a lower level of oxidized low-density lipoprotein (LDL) cholesterol -- the \"bad\" cholesterol that's more likely to build up deposits in your arteries. In fact, a meta-analysis of more than 1.5 million healthy adults demonstrated that following a Mediterranean diet was associated with a reduced risk of cardiovascular mortality as well as overall mortality.    The Mediterranean diet is also associated with a reduced incidence of cancer, and Parkinson's and Alzheimer's diseases. Women who eat a Mediterranean diet supplemented with extra-virgin olive oil and mixed nuts may have a reduced risk of breast cancer.For these reasons, most if not all major scientific organizations encourage healthy adults to adapt a style of eating like that of the Mediterranean diet for prevention of major chronic diseases.    Key components of the Mediterranean diet    The Mediterranean diet emphasizes:     Eating primarily " "plant-based foods, such as fruits and vegetables, whole grains, legumes and nuts   Replacing butter with healthy fats such as olive oil and canola oil   Using herbs and spices instead of salt to flavor foods   Limiting red meat to no more than a few times a month   Eating fish and poultry at least twice a week   Enjoying meals with family and friends   Drinking red wine in moderation (optional)   Getting plenty of exercise   Fruits, vegetables, nuts and grains    The Mediterranean diet traditionally includes fruits, vegetables, pasta and rice. For example, residents of [facility] eat very little red meat and average nine servings a day of antioxidant-rich fruits and vegetables.  Grains in the Mediterranean region are typically whole grain and usually contain very few unhealthy trans fats, and bread is an important part of the diet there. However, throughout the Mediterranean region, bread is eaten plain or dipped in olive oil -- not eaten with butter or margarines, which contain saturated or trans fats.  Nuts are another part of a healthy Mediterranean diet. Nuts are high in fat (approximately 80 percent of their calories come from fat), but most of the fat is not saturated. Because nuts are high in calories, they should not be eaten in large amounts -- generally no more than a handful a day. Avoid candied or honey-roasted and heavily salted nuts.    Healthy fats  The focus of the Mediterranean diet isn't on limiting total fat consumption, but rather to make moreno choices about the types of fat you eat. The Mediterranean diet discourages saturated fats and hydrogenated oils (trans fats), both of which contribute to heart disease.The Mediterranean diet features olive oil as the primary source of fat. Olive oil provides monounsaturated fat -- a type of fat that can help reduce LDL cholesterol levels when used in place of saturated or trans fats.\"Extra-virgin\" and \"virgin\" olive oils -- the least processed forms -- also " contain the highest levels of the protective plant compounds that provide antioxidant effects.    Monounsaturated fats and polyunsaturated fats, such as canola oil and some nuts, contain the beneficial linolenic acid (a type of omega-3 fatty acid). Omega-3 fatty acids lower triglycerides, decrease blood clotting, are associated with decreased sudden heart attack, improve the health of your blood vessels, and help moderate blood pressure. Fatty fish -- such as mackerel, lake trout, herring, sardines, albacore tuna and salmon -- are rich sources of omega-3 fatty acids. Fish is eaten on a regular basis in the Mediterranean diet.    Wine    The health effects of alcohol have been debated for many years, and some doctors are reluctant to encourage alcohol consumption because of the health consequences of excessive drinking.However, alcohol -- in moderation -- has been associated with a reduced risk of heart disease in some research studies.    The Mediterranean diet typically includes a moderate amount of wine. This means no more than 5 ounces (148 milliliters) of wine daily for women (or men over age 65), and no more than 10 ounces (296 milliliters) of wine daily for men under age 65. If you're unable to limit your alcohol intake to the amounts defined above, if you have a personal or family history of alcohol abuse, or if you have heart or liver disease, refrain from drinking wine or any other alcohol.    Putting it all together    The Mediterranean diet is a delicious and healthy way to eat. Many people who switch to this style of eating say they'll never eat any other way. Here are some specific steps to get you started:    Eat your veggies and fruits -- and switch to whole grains. An abundance and variety of plant foods should make up the majority of your meals. Strive for seven to 10 servings a day of veggies and fruits. Switch to whole-grain bread and cereal, and begin to eat more whole-gain rice and pasta  products.  Go nuts. Keep almonds, cashews, pistachios and walnuts on hand for a quick snack. Choose natural peanut butter, rather than the kind with hydrogenated fat added. Try tahini (blended sesame seeds) as a dip or spread for bread.  Pass on the butter. Try olive or canola oil as a healthy replacement for butter or margarine. Use it in cooking. Dip bread in flavored olive oil or lightly spread it on whole-grain bread for a tasty alternative to butter. Or try tahini as a dip or spread.  Spice it up. Herbs and spices make food tasty and are also rich in health-promoting substances. Season your meals with herbs and spices rather than salt.  Go fish. Eat fish once or twice a week. Fresh or water-packed tuna, salmon, trout, mackerel and herring are healthy choices. Grilled fish tastes good and requires little cleanup. Avoid fried fish, unless it's sauteed in a small amount of canola oil.  Rein in the red meat. Substitute fish and poultry for red meat. When eaten, make sure it's lean and keep portions small (about the size of a deck of cards). Also avoid sausage, smallwood and other high-fat meats.  Choose low-fat dairy. Limit higher fat dairy products such as whole or 2 percent milk, cheese and ice cream. Switch to skim milk, fat-free yogurt and low-fat cheese.  Raise a glass to healthy eating. If it's OK with your doctor, have a glass of wine at dinner. If you don't drink alcohol, you don't need to start. Drinking purple grape juice may be an alternative to wine.

## 2024-05-23 LAB
ALBUMIN SERPL-MCNC: 4.5 G/DL (ref 3.5–5.2)
ALBUMIN/GLOB SERPL: 1.9 G/DL
ALP SERPL-CCNC: 55 U/L (ref 39–117)
ALT SERPL-CCNC: 23 U/L (ref 1–33)
AST SERPL-CCNC: 16 U/L (ref 1–32)
BILIRUB SERPL-MCNC: 0.4 MG/DL (ref 0–1.2)
BUN SERPL-MCNC: 15 MG/DL (ref 6–20)
BUN/CREAT SERPL: 18.8 (ref 7–25)
CALCIUM SERPL-MCNC: 9.3 MG/DL (ref 8.6–10.5)
CHLORIDE SERPL-SCNC: 103 MMOL/L (ref 98–107)
CHOLEST SERPL-MCNC: 204 MG/DL (ref 0–200)
CO2 SERPL-SCNC: 27.4 MMOL/L (ref 22–29)
CREAT SERPL-MCNC: 0.8 MG/DL (ref 0.57–1)
EGFRCR SERPLBLD CKD-EPI 2021: 98.7 ML/MIN/1.73
ERYTHROCYTE [DISTWIDTH] IN BLOOD BY AUTOMATED COUNT: 12.9 % (ref 12.3–15.4)
GLOBULIN SER CALC-MCNC: 2.4 GM/DL
GLUCOSE SERPL-MCNC: 89 MG/DL (ref 65–99)
HCT VFR BLD AUTO: 42.6 % (ref 34–46.6)
HDLC SERPL-MCNC: 41 MG/DL (ref 40–60)
HGB BLD-MCNC: 14 G/DL (ref 12–15.9)
LDLC SERPL CALC-MCNC: 135 MG/DL (ref 0–100)
LDLC/HDLC SERPL: 3.23 {RATIO}
MCH RBC QN AUTO: 28.2 PG (ref 26.6–33)
MCHC RBC AUTO-ENTMCNC: 32.9 G/DL (ref 31.5–35.7)
MCV RBC AUTO: 85.9 FL (ref 79–97)
PLATELET # BLD AUTO: 257 10*3/MM3 (ref 140–450)
POTASSIUM SERPL-SCNC: 4.2 MMOL/L (ref 3.5–5.2)
PROT SERPL-MCNC: 6.9 G/DL (ref 6–8.5)
RBC # BLD AUTO: 4.96 10*6/MM3 (ref 3.77–5.28)
SODIUM SERPL-SCNC: 139 MMOL/L (ref 136–145)
TRIGL SERPL-MCNC: 153 MG/DL (ref 0–150)
TSH SERPL DL<=0.005 MIU/L-ACNC: 1.27 UIU/ML (ref 0.27–4.2)
VLDLC SERPL CALC-MCNC: 28 MG/DL (ref 5–40)
WBC # BLD AUTO: 6.6 10*3/MM3 (ref 3.4–10.8)

## 2024-05-31 ENCOUNTER — TREATMENT (OUTPATIENT)
Dept: PHYSICAL THERAPY | Facility: CLINIC | Age: 34
End: 2024-05-31
Payer: COMMERCIAL

## 2024-05-31 DIAGNOSIS — S39.012D STRAIN OF LUMBAR REGION, SUBSEQUENT ENCOUNTER: ICD-10-CM

## 2024-05-31 DIAGNOSIS — M54.50 ACUTE LEFT-SIDED LOW BACK PAIN WITHOUT SCIATICA: Primary | ICD-10-CM

## 2024-05-31 DIAGNOSIS — Z74.09 IMPAIRED FUNCTIONAL MOBILITY AND ACTIVITY TOLERANCE: ICD-10-CM

## 2024-05-31 NOTE — PROGRESS NOTES
Physical Therapy Initial Evaluation and Plan of Care      Patient: Page Austin   : 1990  Diagnosis/ICD-10 Code:  Acute left-sided low back pain without sciatica [M54.50]  Referring practitioner: Lidia Marroquin MD  Date of Initial Visit: 2024  Today's Date: 2024  Patient seen for 1 sessions    Visit Diagnoses:    ICD-10-CM ICD-9-CM   1. Acute left-sided low back pain without sciatica  M54.50 724.2   2. Strain of lumbar region, subsequent encounter  S39.012D V58.89     847.2   3. Impaired functional mobility and activity tolerance  Z74.09 V49.89       [facility] [place] [address]  [phone] (phone)  [phone] (fax)         Subjective Evaluation    History of Present Illness  Date of onset: 2024  Mechanism of injury: Page was doing an exercise video. She was doing squats with dumbbells. The instructor had them move the weights from the shoulders to in front of the chest. ON the first rep, she felt a sharp pain in the L LB. The pain has not changed over the last 5 wks. No N&T. No weakness noted. She has an issue with long car rides (was on vacation and had a hard time with the drive). She has a chiro that she has seen about 5 times since the incident (not in about 2 wks), she got a massage over the weekend, it felt great for about an hour, but was sore for a few days. She has HTN regulated by meds. She has a stand up desk at home, which is more comfortable than sitting.       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) Pain  Current pain ratin  At best pain ratin (lying down)  At worst pain ratin  Location: L LBP  Quality: throbbing, tight and pulling (spasm)  Relieving factors: heat and rest (lying supine, pillow under knees)  Aggravating factors: sleeping, prolonged positioning and keyboarding (transfer in/out of car, sitting more than 30 min, sit to stand)  Progression: no change    Social  Support  Lives in: multiple-level home (ranch w/basement laundry)  Lives with: spouse    Diagnostic Tests  No diagnostic tests performed    Treatments  Current treatment: chiropractic and massage  Patient Goals  Patient goals for therapy: decreased pain and return to sport/leisure activities  Patient goal: to prevent reoccurance           Objective          Static Posture     Comments  Level iliac crests, normal lumbar lordosis    Palpation   Left   Hypertonic in the erector spinae, gluteus medius, lumbar paraspinals, piriformis and quadratus lumborum.   Tenderness of the erector spinae, gluteus medius, lumbar paraspinals, piriformis and quadratus lumborum.     Right   Hypertonic in the erector spinae, lumbar paraspinals and quadratus lumborum. Tenderness of the lumbar paraspinals and quadratus lumborum.     Tenderness     Additional Tenderness Details  L iliolumbar ligament with springing      Active Range of Motion     Additional Active Range of Motion Details  Lumbar AROM:  Flexion= 50%, increased pain on L lower lumbar  Extension=75 %  R rotation= 80%  L rotation= 80%  R lateral flexion= 75%, good stretch on the L side  L lateral flexion= 75%      Strength/Myotome Testing     Lumbar   Left   Heel walk: normal  Toe walk: normal    Right   Heel walk: normal  Toe walk: normal    Left Hip   Planes of Motion   Flexion: 4+  Abduction: 4+  Adduction: 5  External rotation: 4+    Right Hip   Planes of Motion   Flexion: 4+  Abduction: 4+  Adduction: 5  External rotation: 4+    Left Knee   Flexion: 5  Extension: 5    Right Knee   Flexion: 5  Extension: 5    Left Ankle/Foot   Dorsiflexion: 5  Plantar flexion: 5    Right Ankle/Foot   Dorsiflexion: 5  Plantar flexion: 5    Muscle Activation     Additional Muscle Activation Details  Delayed activation of the L multifidus    Tests     Lumbar     Left   Positive passive SLR.   Negative quadrant.     Right   Negative passive SLR and quadrant.     Left Hip   Negative ANGELINA and  FADIR.     Right Hip   Negative ANGELINA and FADIR.     Left Hip Flexibility Comments:   Mild tightness of hip ER and IR, moderate tightness of hamstrings    Right Hip Flexibility Comments:   Mild tightness of hip ER, IR, and hamstrings    Ambulation     Comments   No deviations noted in clinic today        Exercise/therapeutic activity/ education:  -ab brace, exhale with TA contraction, 10x 5 sec  -hip add iso, exhale with TA contraction 10x 5 sec   -piriformis stretch 3x20 sec  -hamstring 90/90 stretch 3x20 sec  -child's pose fwd and lateral x20 sec  -shown body mechanics for golfer's , standing at the sink, getting in/out of her car  Decompression position throughout the day to relieve pain  Given a tennis ball for trigger point work at home    Functional Outcome Score:   Oswestry Back Index=50%        Assessment & Plan       Assessment  Impairments: abnormal or restricted ROM, activity intolerance, impaired physical strength, lacks appropriate home exercise program and pain with function   Functional limitations: carrying objects, lifting, sleeping, walking, uncomfortable because of pain, moving in bed, sitting, stooping and unable to perform repetitive tasks   Assessment details: Page Austin is a 34 y.o. year-old female referred to physical therapy for LBP. She presents with a evolving clinical presentation.  She has no comorbidities and personal factors of a desk job that may affect her progress in the plan of care.  Pt has decreased lumbar AROM with L sided lower lumbar pain, pain in L lower lumbar with PA springing, decreased core strength, and pain with work and ADLs. Pt would benefit from therapy to help improve her ability to sleep, sit, and return to working out without back pain.   Prognosis: good    Goals  Plan Goals: ST wks  1. Patient will be independent with education for symptom management, joint protection and strategies to minimize stress on affected tissues  2. Pt to improve pain  complaints to no more than 4/10 with ADLs, sleeping, and sitting for work/driving    LT wks  1. Pt to improve pain complaints to no more than 1/10 with ADLs, sitting at her desk, and sleeping  2. Pt to improve trunk and LE strength by 1/2 to 1 MMT grade for greater ease with lifting groceries/laundry without increased pain  3. Pt to improve Oswestry Back index score from 50% to 12% for overall functional improvement with transfers and working out  4. Pt to be independent with HEP for ROM, flexibility and core strength    Plan  Therapy options: will be seen for skilled therapy services  Planned modality interventions: cryotherapy, electrical stimulation/Russian stimulation, TENS, traction, ultrasound, dry needling and thermotherapy (hydrocollator packs)  Planned therapy interventions: abdominal trunk stabilization, ADL retraining, body mechanics training, flexibility, functional ROM exercises, home exercise program, IADL retraining, joint mobilization, manual therapy, neuromuscular re-education, postural training, soft tissue mobilization, spinal/joint mobilization, strengthening, stretching and therapeutic activities  Frequency: 2x week  Duration in weeks: 12  Treatment plan discussed with: patient        Timed:  Manual Therapy:         mins  33313;  Therapeutic Exercise:    10     mins  60397;     Neuromuscular Kj:        mins  86521;    Therapeutic Activity:     8     mins  12471;     Gait Training:           mins  87530;     Ultrasound:          mins  70718;    Iontophoresis         mins 15636        Untimed:  Electrical Stimulation:         mins  57184 ( );  Traction:       mins  45538;   Dry Needling   (1-2 muscles)             mins 99626 (Self-pay)  Dry Needling (3-4 muscles)           mins  (Self-pay)  Dry Needling Trial              mins DRYNDLTRIAL  (No Charge)    Low Eval          Mins  76659  Mod Eval     25     Mins  41445  High Eval                            Mins  09215    Timed  Treatment:   18   mins   Total Treatment:     43   mins    PT SIGNATURE: Roxanne Muro PT, CDNT    License Number: [id number]    Electronically signed by Roxanne Muro PT, 05/31/24, 9:38 AM EDT    DATE TREATMENT INITIATED: 5/31/2024    Initial Certification  Certification Period: 8/29/2024  I certify that the therapy services are furnished while this patient is under my care.  The services outlined above are required by this patient, and will be reviewed every 90 days.     PHYSICIAN: Lidia Marroquin MD   NPI: [id number]                                         DATE:     Please sign and return via fax to [phone] Thank you, [facility].

## 2024-06-03 ENCOUNTER — TREATMENT (OUTPATIENT)
Dept: PHYSICAL THERAPY | Facility: CLINIC | Age: 34
End: 2024-06-03
Payer: COMMERCIAL

## 2024-06-03 DIAGNOSIS — M54.50 ACUTE LEFT-SIDED LOW BACK PAIN WITHOUT SCIATICA: Primary | ICD-10-CM

## 2024-06-03 DIAGNOSIS — S39.012D STRAIN OF LUMBAR REGION, SUBSEQUENT ENCOUNTER: ICD-10-CM

## 2024-06-03 DIAGNOSIS — Z74.09 IMPAIRED FUNCTIONAL MOBILITY AND ACTIVITY TOLERANCE: ICD-10-CM

## 2024-06-03 PROCEDURE — 97110 THERAPEUTIC EXERCISES: CPT | Performed by: PHYSICAL THERAPIST

## 2024-06-03 PROCEDURE — DRYNDLTRIAL DRY NEEDLING TRIAL: Performed by: PHYSICAL THERAPIST

## 2024-06-03 PROCEDURE — 97140 MANUAL THERAPY 1/> REGIONS: CPT | Performed by: PHYSICAL THERAPIST

## 2024-06-03 NOTE — PROGRESS NOTES
Physical Therapy Daily Treatment Note    Patient: Page Austin   : 1990  Diagnosis/ICD-10 Code:  Acute left-sided low back pain without sciatica [M54.50]  Referring practitioner: Lidia Marroquin MD  Date of Initial Visit: Type: THERAPY  Noted: 2024  Today's Date: 6/3/2024  Patient seen for 2 sessions    Visit Diagnoses:    ICD-10-CM ICD-9-CM   1. Acute left-sided low back pain without sciatica  M54.50 724.2   2. Strain of lumbar region, subsequent encounter  S39.012D V58.89     847.2   3. Impaired functional mobility and activity tolerance  Z74.09 V49.89       [facility] Therapy [facility]  [address] [place] [address]  [phone] (phone)  [phone] (fax)         Subjective one of the stretches I feel it more in my back than my leg    Objective     Exercise/therapeutic activity/ education:  -ab brace, exhale with TA contraction, 20x 5 sec  -hip add iso, exhale with TA contraction 20x 5 sec   -bridge 2x10  -piriformis stretch 3x20 sec  -Fig 4 3x20 sec  -SKTC 3x20 sec  -hamstring 90/90 stretch 3x20 sec DEFER (back pain)  -child's pose fwd and lateral x20 sec  DEFER    Dry Needling trial:  Dry needling, using threading and direct techniques, obtaining written and verbal consent to treat after discussing benefits and risks.   Patient position during treatment: prone over pillow. Muscles treated: B lumbar PVMs, multifidi, and QL. Response: LTRs.Clean needle technique observed at all times, precautions for lung fields, neurovascular structures observed. Manual palpation and assessment performed before, during, and after session.    Manual:  Pt in prone over a pillow, STM to B lumbar PVMs and QL with light manual distraction using a cross hands technique to lumbar spine      Assessment/Plan  Did a trial of dry needling today. Pt tolerated it well, did have some sweating of her palms during the procedure and felt like she had low blood sugar. She was given a chato lozenge  which helped her to feel better. She didn't have any increased pain following the session and felt that she had improved mobility of the spine. She was advised to ice, increase hydration, and if she had any soreness to take an OTC reliever of her choice.          Timed:    Manual Therapy:    15     mins  54495;  Therapeutic Exercise:     12    mins  83349;     Neuromuscular Kj:        mins  14344;    Therapeutic Activity:          mins  44557;     Gait Training:           mins  14764;     Ultrasound:          mins  50549;    Electrical Stimulation:         mins  65607 ( );  Iontophoresis         mins 90070;  Aquatic Therapy         mins 01836;      Untimed:  Electrical Stimulation:         mins  96229 ( );  Traction:         mins  00501;   Dry Needling   (1-2 muscles)             mins 20560 (Self-pay)  Dry Needling (3-4 muscles)           mins 20561 (Self-pay)  Dry Needling Trial    15          mins DRYNDLTRIAL  (No Charge)    Timed Treatment:   27   mins   Total Treatment:     42   mins    Roxanne Muro PT, CDNT  Physical Therapist    [place] License:[id number]    Addendum for correction of timed treatment

## 2024-06-05 ENCOUNTER — TREATMENT (OUTPATIENT)
Dept: PHYSICAL THERAPY | Facility: CLINIC | Age: 34
End: 2024-06-05
Payer: COMMERCIAL

## 2024-06-05 DIAGNOSIS — Z74.09 IMPAIRED FUNCTIONAL MOBILITY AND ACTIVITY TOLERANCE: ICD-10-CM

## 2024-06-05 DIAGNOSIS — S39.012D STRAIN OF LUMBAR REGION, SUBSEQUENT ENCOUNTER: ICD-10-CM

## 2024-06-05 DIAGNOSIS — M54.50 ACUTE LEFT-SIDED LOW BACK PAIN WITHOUT SCIATICA: Primary | ICD-10-CM

## 2024-06-05 NOTE — PROGRESS NOTES
Physical Therapy Daily Treatment Note    Patient: Page Austin   : 1990  Diagnosis/ICD-10 Code:  Acute left-sided low back pain without sciatica [M54.50]  Referring practitioner: Lidia Marroquin MD  Date of Initial Visit: Type: THERAPY  Noted: 2024  Today's Date: 2024  Patient seen for 3 sessions    Visit Diagnoses:    ICD-10-CM ICD-9-CM   1. Acute left-sided low back pain without sciatica  M54.50 724.2   2. Strain of lumbar region, subsequent encounter  S39.012D V58.89     847.2   3. Impaired functional mobility and activity tolerance  Z74.09 V49.89       [facility] Physical Therapy Mil[facility]  [address] [place] [address]  [phone] (phone)  [phone] (fax)         Subjective  I wasn't very sore from the dry needling, felt more like fatigue. My pain is about a 2/10. I was able to shower and don shoes with greater ease.    Objective     Exercise/therapeutic activity/ education:  -ab brace, exhale with TA contraction, 20x 5 sec  -hip add iso, exhale with TA contraction 20x 5 sec   -bridge 2x10  -hip abd/ER with blue band, 2x10 B and single leg  -piriformis stretch 3x20 sec  -Fig 4 3x20 sec  -SKTC 3x20 sec  -hamstring 90/90 stretch 3x20 sec   -angry cat, 10x 5 sec  -child's pose fwd and lateral x20 sec        Assessment/Plan  Pt had good results with dry needling. She has improved mobility with her ADLs with decreased pain. Sleep pattern has improved as well.         Timed:    Manual Therapy:         mins  84341;  Therapeutic Exercise:    35     mins  76732;     Neuromuscular Kj:    8    mins  99770;    Therapeutic Activity:          mins  15933;     Gait Training:           mins  75078;     Ultrasound:          mins  97530;    Electrical Stimulation:         mins  92598 ( );  Iontophoresis         mins 21496;  Aquatic Therapy         mins 79834;      Untimed:  Electrical Stimulation:         mins  38557 ( );  Traction:         mins  86918;   Dry  Needling   (1-2 muscles)             mins 20560 (Self-pay)  Dry Needling (3-4 muscles)           mins 20561 (Self-pay)  Dry Needling Trial              mins DRYNDLTRIAL  (No Charge)    Timed Treatment:   43   mins   Total Treatment:     43   mins    Roxanne Muro PT, CDNT  Physical Therapist    [place] License:[id number]

## 2024-06-11 ENCOUNTER — TREATMENT (OUTPATIENT)
Dept: PHYSICAL THERAPY | Facility: CLINIC | Age: 34
End: 2024-06-11
Payer: COMMERCIAL

## 2024-06-11 DIAGNOSIS — Z74.09 IMPAIRED FUNCTIONAL MOBILITY AND ACTIVITY TOLERANCE: ICD-10-CM

## 2024-06-11 DIAGNOSIS — M54.50 ACUTE LEFT-SIDED LOW BACK PAIN WITHOUT SCIATICA: Primary | ICD-10-CM

## 2024-06-11 DIAGNOSIS — S39.012D STRAIN OF LUMBAR REGION, SUBSEQUENT ENCOUNTER: ICD-10-CM

## 2024-06-11 NOTE — PROGRESS NOTES
Physical Therapy Daily Treatment Note    Patient: Page Austin   : 1990  Diagnosis/ICD-10 Code:  Acute left-sided low back pain without sciatica [M54.50]  Referring practitioner: Lidia Marroquin MD  Date of Initial Visit: Type: THERAPY  Noted: 2024  Today's Date: 2024  Patient seen for 4 sessions    Visit Diagnoses:    ICD-10-CM ICD-9-CM   1. Acute left-sided low back pain without sciatica  M54.50 724.2   2. Strain of lumbar region, subsequent encounter  S39.012D V58.89     847.2   3. Impaired functional mobility and activity tolerance  Z74.09 V49.89       [facility] Physical Therapy Mil[facility]  [address] [place] [address]  [phone] (phone)  [phone] (fax)         Subjective  overall doing much better, sleeping and moving better, still having issues with sitting longer than about 20 min, feel that I have to unload my back    Objective     Exercise/therapeutic activity/ education:  -ab brace, exhale with TA contraction, 20x 5 sec  -hip add iso, exhale with TA contraction 20x 5 sec   -bridge 2x10  DEFER  -piriformis stretch 3x20 sec  -Fig 4 3x20 sec  -SKTC 3x20 sec  -hamstring 90/90 stretch 3x20 sec - seated (still has some back pain doing with L LE)  -child's pose fwd and lateral x20 sec  DEFER     Dry Needling:  Dry needling, using threading and direct techniques, obtaining verbal consent to treat after discussing benefits and risks.   Patient position during treatment: prone over pillow. Muscles treated: B lumbar PVMs, multifidi, and QL. Response: LTRs.Clean needle technique observed at all times, precautions for lung fields, neurovascular structures observed. Manual palpation and assessment performed before, during, and after session.     Manual:  Pt in prone over a pillow, STM to B lumbar PVMs and QL with light manual distraction using a cross hands technique to lumbar spine    Assessment/Plan  Pt's pain levels and mobility have really improved over the last  week. The dry needling has released the muscles well. She still has some neural tension on the L, difficulty with LAQ and hamstring stretching.          Timed:    Manual Therapy:    15     mins  67111;  Therapeutic Exercise:    15     mins  19531;     Neuromuscular Kj:        mins  27801;    Therapeutic Activity:          mins  35167;     Gait Training:           mins  21123;     Ultrasound:          mins  10583;    Electrical Stimulation:         mins  63709 ( );  Iontophoresis         mins 19658;  Aquatic Therapy         mins 26539;      Untimed:  Electrical Stimulation:         mins  28951 ( );  Traction:         mins  85455;   Dry Needling   (1-2 muscles)     15        mins 20560 (Self-pay)  Dry Needling (3-4 muscles)           mins 20561 (Self-pay)  Dry Needling Trial              mins DRYNDLTRIAL  (No Charge)    Timed Treatment:   30   mins   Total Treatment:     45   mins    Roxanne Muro PT, CDNT  Physical Therapist    [place] License:[id number]

## 2024-06-14 ENCOUNTER — TREATMENT (OUTPATIENT)
Dept: PHYSICAL THERAPY | Facility: CLINIC | Age: 34
End: 2024-06-14
Payer: COMMERCIAL

## 2024-06-14 DIAGNOSIS — Z74.09 IMPAIRED FUNCTIONAL MOBILITY AND ACTIVITY TOLERANCE: ICD-10-CM

## 2024-06-14 DIAGNOSIS — M54.50 ACUTE LEFT-SIDED LOW BACK PAIN WITHOUT SCIATICA: Primary | ICD-10-CM

## 2024-06-14 DIAGNOSIS — S39.012D STRAIN OF LUMBAR REGION, SUBSEQUENT ENCOUNTER: ICD-10-CM

## 2024-06-14 NOTE — PROGRESS NOTES
Physical Therapy Daily Treatment Note    Patient: Page Austin   : 1990  Diagnosis/ICD-10 Code:  Acute left-sided low back pain without sciatica [M54.50]  Referring practitioner: Lidia Marroquin MD  Date of Initial Visit: Type: THERAPY  Noted: 2024  Today's Date: 2024  Patient seen for 5 sessions    Visit Diagnoses:    ICD-10-CM ICD-9-CM   1. Acute left-sided low back pain without sciatica  M54.50 724.2   2. Strain of lumbar region, subsequent encounter  S39.012D V58.89     847.2   3. Impaired functional mobility and activity tolerance  Z74.09 V49.89       [facility] Physical Therapy Milestone  [address] [place] [address]  [phone] (phone)  [phone] (fax)         Subjective I had a couple of very long work days, was in the office all day and then had some social things after work. I was so stiff I could hardly get into the car. Went home and got on some heat.     Objective   See  Modality Logs for complete treatment.     Exercise/therapeutic activity/ education:  -resting on 1/2 deflated Pilates ball under sacrum, 4 min  -ab brace, exhale with TA contraction, 20x 5 sec  -hip add iso, exhale with TA contraction 20x 5 sec   -bridge 2x10    -piriformis stretch 3x20 sec  -Fig 4 3x20 sec  -SKTC 3x20 sec  -hamstring 90/90 stretch 3x20 sec - seated (still has some back pain doing with L LE)  DEFER  -child's pose fwd and lateral x20 sec  DEFER     Manual:  Pt in prone over a pillow, STM to B lumbar PVMs and QL with light manual distraction using a cross hands technique to lumbar spine    Assessment/Plan  Pt had to go into her office for two days in a row and was sitting a lot in a different chair. She has had a lot more stiffness and difficulty with transfers, mobility. She did have decreased pain post treatment.          Timed:    Manual Therapy:    12     mins  69384;  Therapeutic Exercise:    23     mins  52689;     Neuromuscular Kj:        mins  31120;     Therapeutic Activity:          mins  69285;     Gait Training:           mins  64381;     Ultrasound:     10     mins  07196;    Electrical Stimulation:         mins  06533 ( );  Iontophoresis         mins 44508;  Aquatic Therapy         mins 15061;      Untimed:  Electrical Stimulation:         mins  27907 ( );  Traction:         mins  26581;   Dry Needling   (1-2 muscles)             mins 20560 (Self-pay)  Dry Needling (3-4 muscles)           mins 20561 (Self-pay)  Dry Needling Trial              mins DRYNDLTRIAL  (No Charge)    Timed Treatment:   45   mins   Total Treatment:     45   mins    Roxanne Muro PT, CDNT  Physical Therapist    [place] License:[id number]   Wheelchair/Stroller

## 2024-06-18 ENCOUNTER — TREATMENT (OUTPATIENT)
Dept: PHYSICAL THERAPY | Facility: CLINIC | Age: 34
End: 2024-06-18
Payer: COMMERCIAL

## 2024-06-18 DIAGNOSIS — S39.012D STRAIN OF LUMBAR REGION, SUBSEQUENT ENCOUNTER: ICD-10-CM

## 2024-06-18 DIAGNOSIS — Z74.09 IMPAIRED FUNCTIONAL MOBILITY AND ACTIVITY TOLERANCE: ICD-10-CM

## 2024-06-18 DIAGNOSIS — M54.50 ACUTE LEFT-SIDED LOW BACK PAIN WITHOUT SCIATICA: Primary | ICD-10-CM

## 2024-06-18 NOTE — PROGRESS NOTES
Physical Therapy Daily Treatment Note    Patient: Page Austin   : 1990  Diagnosis/ICD-10 Code:  Acute left-sided low back pain without sciatica [M54.50]  Referring practitioner: Lidia Marroquin MD  Date of Initial Visit: Type: THERAPY  Noted: 2024  Today's Date: 2024  Patient seen for 6 sessions    Visit Diagnoses:    ICD-10-CM ICD-9-CM   1. Acute left-sided low back pain without sciatica  M54.50 724.2   2. Strain of lumbar region, subsequent encounter  S39.012D V58.89     847.2   3. Impaired functional mobility and activity tolerance  Z74.09 V49.89       [facility] Physical Therapy [facility]  [address] [place] [address]  [phone] (phone)  [phone] (fax)         Subjective I am still having pain in the L side of my low back and over the weekend my L hip and glute were really sore    Objective     Exercise/therapeutic activity/ education:  -hip add iso, exhale with TA contraction 20x 5 sec, on 1/2 deflated Pilates ball   -bridge 2x10  DEFER  -piriformis stretch 3x20 sec  -Fig 4 3x20 sec  -SKTC 3x20 sec  -hamstring 90/90 stretch 3x20 sec - seated (still has some back pain doing with L LE)  -child's pose fwd and lateral x20 sec  DEFER     Dry Needling:  Dry needling, using threading and direct techniques, obtaining verbal consent to treat after discussing benefits and risks.   Patient position during treatment: R SL,pillow between legs. Muscles treated: L lumbar PVMs, multifidi, QL, piriformis. Response: LTRs.Clean needle technique observed at all times, precautions for lung fields, neurovascular structures observed. Manual palpation and assessment performed before, during, and after session.     Manual:  Pt in prone over a pillow, STM to L lumbar PVMs and QL with light manual distraction force on the iliac crest, STM to L gluteals with sacral release      Assessment/Plan  After the last dry needling session a week ago, pt had to go into the office and sit in a  chair she is not used to for two days. She ended up with increased pain after this and over the weekend started having pain in the L buttocks and L lateral calf (L5 dermatome). She was sore after the session today, but did feel more mobile and less tense         Timed:    Manual Therapy:    15     mins  74191;  Therapeutic Exercise:    15     mins  41852;     Neuromuscular Kj:        mins  74987;    Therapeutic Activity:          mins  51813;     Gait Training:           mins  70523;     Ultrasound:          mins  61142;    Electrical Stimulation:         mins  85172 ( );  Iontophoresis         mins 25840;  Aquatic Therapy         mins 23359;      Untimed:  Electrical Stimulation:         mins  86928 ( );  Traction:         mins  81906;   Dry Needling   (1-2 muscles)     15        mins 55417 (Self-pay)  Dry Needling (3-4 muscles)           mins 20561 (Self-pay)  Dry Needling Trial              mins DRYNDLTRIAL  (No Charge)    Timed Treatment:   45   mins   Total Treatment:     45   mins    Roxanne Muro PT, CDNT  Physical Therapist    [place] License:[id number]

## 2024-06-21 ENCOUNTER — TREATMENT (OUTPATIENT)
Dept: PHYSICAL THERAPY | Facility: CLINIC | Age: 34
End: 2024-06-21
Payer: COMMERCIAL

## 2024-06-21 DIAGNOSIS — Z74.09 IMPAIRED FUNCTIONAL MOBILITY AND ACTIVITY TOLERANCE: ICD-10-CM

## 2024-06-21 DIAGNOSIS — S39.012D STRAIN OF LUMBAR REGION, SUBSEQUENT ENCOUNTER: ICD-10-CM

## 2024-06-21 DIAGNOSIS — M54.50 ACUTE LEFT-SIDED LOW BACK PAIN WITHOUT SCIATICA: Primary | ICD-10-CM

## 2024-06-21 NOTE — PROGRESS NOTES
Physical Therapy Daily Treatment Note    Patient: Page Austin   : 1990  Diagnosis/ICD-10 Code:  Acute left-sided low back pain without sciatica [M54.50]  Referring practitioner: Lidia Marroquin MD  Date of Initial Visit: Type: THERAPY  Noted: 2024  Today's Date: 2024  Patient seen for 7 sessions    Visit Diagnoses:    ICD-10-CM ICD-9-CM   1. Acute left-sided low back pain without sciatica  M54.50 724.2   2. Strain of lumbar region, subsequent encounter  S39.012D V58.89     847.2   3. Impaired functional mobility and activity tolerance  Z74.09 V49.89       [facility] Physical Therapy Milestone  [address] [place] [address]  [phone] (phone)  [phone] (fax)         Subjective I do feel better after the last session    Objective     See Modalities for complete treatment    Exercise/therapeutic activity/ education:  -hip add iso, exhale with TA contraction 20x 5 sec, on  deflated Pilates ball   -bridge 2x10  DEFER  -piriformis stretch 3x20 sec  -Fig 4 3x20 sec  -SKTC 3x20 sec  -hamstring 90/90 stretch 3x20 sec - seated (still has some back pain doing with L LE)  DEFER  -child's pose fwd and lateral x20 sec  DEFER       Manual:  Pt in R SL with pillow between knees, STM to L lumbar PVMs and QL with light manual distraction force on the iliac crest, STM to L gluteals with sacral release    Assessment/Plan  Pt has had decreased pain levels since her last visit. She still is having some pain in the L buttock and some into the LE, but the tenderness reduced with trigger point release of the L piriformis. She may have a small disc bulge affecting the L L5 nerve root. Pain is improved overall. Discussed current progress and if she does not improve over the next couple of weeks, then will contact MD about MRI.         Timed:    Manual Therapy:    12     mins  98373;  Therapeutic Exercise:    23     mins  93157;     Neuromuscular Kj:        mins  00388;    Therapeutic  Activity:          mins  26057;     Gait Training:           mins  64988;     Ultrasound:     10     mins  84577;    Electrical Stimulation:         mins  06986 ( );  Iontophoresis         mins 27237;  Aquatic Therapy         mins 19243;      Untimed:  Electrical Stimulation:         mins  32066 ( );  Traction:         mins  94381;   Dry Needling   (1-2 muscles)             mins 20560 (Self-pay)  Dry Needling (3-4 muscles)           mins 20561 (Self-pay)  Dry Needling Trial              mins DRYNDLTRIAL  (No Charge)    Timed Treatment:   45   mins   Total Treatment:     45   mins    Roxanne Muro PT, CDNT  Physical Therapist    [place] License:[id number]

## 2024-06-25 ENCOUNTER — TREATMENT (OUTPATIENT)
Dept: PHYSICAL THERAPY | Facility: CLINIC | Age: 34
End: 2024-06-25
Payer: COMMERCIAL

## 2024-06-25 DIAGNOSIS — S39.012D STRAIN OF LUMBAR REGION, SUBSEQUENT ENCOUNTER: ICD-10-CM

## 2024-06-25 DIAGNOSIS — M54.42 ACUTE LEFT-SIDED LOW BACK PAIN WITH LEFT-SIDED SCIATICA: Primary | ICD-10-CM

## 2024-06-25 DIAGNOSIS — Z74.09 IMPAIRED FUNCTIONAL MOBILITY AND ACTIVITY TOLERANCE: ICD-10-CM

## 2024-06-25 NOTE — PROGRESS NOTES
Physical Therapy Daily Treatment Note    Patient: Page Austin   : 1990  Diagnosis/ICD-10 Code:  Acute left-sided low back pain with left-sided sciatica [M54.42]  Referring practitioner: Lidia Marroquin MD  Date of Initial Visit: Type: THERAPY  Noted: 2024  Today's Date: 2024  Patient seen for 8 sessions    Visit Diagnoses:    ICD-10-CM ICD-9-CM   1. Acute left-sided low back pain with left-sided sciatica  M54.42 724.2     724.3   2. Strain of lumbar region, subsequent encounter  S39.012D V58.89     847.2   3. Impaired functional mobility and activity tolerance  Z74.09 V49.89       [facility] Physical Therapy Milestone  [address] [place] [address]  [phone] (phone)  [phone] (fax)         Subjective things are slowly settling down. Pain is still there, but not as severe    Objective     See Modalities for complete treatment     Exercise/therapeutic activity/ education:  -hip add iso, exhale with TA contraction 20x 5 sec, on 1/2 deflated Pilates ball   -bridge 2x10  DEFER  -piriformis stretch 3x20 sec  -Fig 4 3x20 sec  -SKTC 3x20 sec  -hamstring 90/90 stretch 3x20 sec - seated (still has some back pain doing with L LE)  DEFER  -child's pose fwd and lateral x20 sec  DEFER        Manual:  Pt in R SL with pillow between knees, STM to L lumbar PVMs and QL with light manual distraction force on the iliac crest, STM to L gluteals with sacral release       Assessment/Plan  Pt continues with some L LE pain, more concentrated in the gluteal muscles. She has some tightness of the piriformis and tenderness along the L SI joint. Will start to progress her strength program next session.          Timed:    Manual Therapy:    15     mins  28263;  Therapeutic Exercise:    20     mins  89633;     Neuromuscular Kj:        mins  22626;    Therapeutic Activity:          mins  65729;     Gait Training:      10     mins  15950;     Ultrasound:          mins  87636;    Electrical  Stimulation:         mins  87003 ( );  Iontophoresis         mins 62406;  Aquatic Therapy         mins 94246;      Untimed:  Electrical Stimulation:         mins  01678 ( );  Traction:         mins  75009;   Dry Needling   (1-2 muscles)             mins 20560 (Self-pay)  Dry Needling (3-4 muscles)           mins 20561 (Self-pay)  Dry Needling Trial              mins DRYNDLTRIAL  (No Charge)    Timed Treatment:   45   mins   Total Treatment:     45   mins    Roxanne Muro PT, CDNT  Physical Therapist    [place] License:[id number]

## 2024-06-28 ENCOUNTER — TREATMENT (OUTPATIENT)
Dept: PHYSICAL THERAPY | Facility: CLINIC | Age: 34
End: 2024-06-28
Payer: COMMERCIAL

## 2024-06-28 DIAGNOSIS — M54.42 ACUTE LEFT-SIDED LOW BACK PAIN WITH LEFT-SIDED SCIATICA: Primary | ICD-10-CM

## 2024-06-28 DIAGNOSIS — Z74.09 IMPAIRED FUNCTIONAL MOBILITY AND ACTIVITY TOLERANCE: ICD-10-CM

## 2024-06-28 DIAGNOSIS — S39.012D STRAIN OF LUMBAR REGION, SUBSEQUENT ENCOUNTER: ICD-10-CM

## 2024-06-28 NOTE — PROGRESS NOTES
Physical Therapy Daily Treatment Note    Patient: Page Austin   : 1990  Diagnosis/ICD-10 Code:  Acute left-sided low back pain with left-sided sciatica [M54.42]  Referring practitioner: Lidia Marroquin MD  Date of Initial Visit: Type: THERAPY  Noted: 2024  Today's Date: 2024  Patient seen for 9 sessions    Visit Diagnoses:    ICD-10-CM ICD-9-CM   1. Acute left-sided low back pain with left-sided sciatica  M54.42 724.2     724.3   2. Strain of lumbar region, subsequent encounter  S39.012D V58.89     847.2   3. Impaired functional mobility and activity tolerance  Z74.09 V49.89       [facility] [place] [address]  [phone] (phone)  [phone] (fax)         Subjective  I have been going for a mile walk in the am then doing my PT exercises and feel pretty good. Pain and leg symptoms are not quite gone, sometimes still feel it in the calf, buttocks, and along the sacrum    Objective     Exercise/therapeutic activity/NM re-ed:  -hip add iso, exhale with TA contraction 20x 5 sec  -bridge 2x10  -piriformis stretch 3x20 sec  -Fig 4 3x20 sec  -SKTC 3x20 sec  -scap ret, green band 2x10  -shoulder ext, green band, 2x10  -cable standing lat pull down, 17.5lb, 2x10  -cable triceps ext, 15lb 2x10  -cable shoulder punch out (in lunge stance) 5lb 2x10  V/t/demo cues for core activation and exercises          Assessment/Plan  Pt is still having some L LE pain to the calf, but the intensity, frequency, and duration are all reduced. We are starting to increase her activities to help strengthen her core and to get her back to her normal activities         Timed:    Manual Therapy:         mins  23499;  Therapeutic Exercise:    35     mins  65909;     Neuromuscular Kj:    8    mins  50558;    Therapeutic Activity:          mins  90823;     Gait Training:           mins  34007;     Ultrasound:          mins  69703;    Electrical Stimulation:          mins  41064 ( );  Iontophoresis         mins 32081;  Aquatic Therapy         mins 97790;      Untimed:  Electrical Stimulation:        mins  87999 ( );  Traction:         mins  54706;   Dry Needling   (1-2 muscles)             mins 20560 (Self-pay)  Dry Needling (3-4 muscles)           mins 20561 (Self-pay)  Dry Needling Trial              mins DRYNDLTRIAL  (No Charge)    Timed Treatment:   43   mins   Total Treatment:     43   mins    Roxanne Muro PT, CDNT  Physical Therapist    [place] License:[id number]

## 2024-07-02 ENCOUNTER — TREATMENT (OUTPATIENT)
Dept: PHYSICAL THERAPY | Facility: CLINIC | Age: 34
End: 2024-07-02
Payer: COMMERCIAL

## 2024-07-02 DIAGNOSIS — M54.42 ACUTE LEFT-SIDED LOW BACK PAIN WITH LEFT-SIDED SCIATICA: Primary | ICD-10-CM

## 2024-07-02 DIAGNOSIS — Z74.09 IMPAIRED FUNCTIONAL MOBILITY AND ACTIVITY TOLERANCE: ICD-10-CM

## 2024-07-02 DIAGNOSIS — S39.012D STRAIN OF LUMBAR REGION, SUBSEQUENT ENCOUNTER: ICD-10-CM

## 2024-07-02 NOTE — PROGRESS NOTES
30-Day / 10-Visit Progress Note         Patient: Page Austin   : 1990  Diagnosis/ICD-10 Code:  Acute left-sided low back pain with left-sided sciatica [M54.42]  Referring practitioner: Lidia Marroquin MD  Date of Initial Visit: Type: THERAPY  Noted: 2024  Today's Date: 2024  Patient seen for 10 sessions    Visit Diagnoses:    ICD-10-CM ICD-9-CM   1. Acute left-sided low back pain with left-sided sciatica  M54.42 724.2     724.3   2. Strain of lumbar region, subsequent encounter  S39.012D V58.89     847.2   3. Impaired functional mobility and activity tolerance  Z74.09 V49.89       [facility] Physical Therapy Milestone  [address] [place] [address]  [phone] (phone)  [phone] (fax)    Subjective:     Clinical Progress: improved  Home Program Compliance: Yes  Treatment has included:  therapeutic exercise, manual therapy, therapeutic activity, neuro-muscular retraining , ultrasound, patient education with home exercise program , and dry needling     Subjective I feel like I am moving almost like normal around the house. I am still not lifting heavier things like the laundry, getting dog toys from the floor. Went to  a family dinner, and about retirement through I was really tightening up (there for about 90 min)    Objective          Strength/Myotome Testing     Left Hip   Planes of Motion   Flexion: 5  Abduction: 4+  Adduction: 5  External rotation: 5    Right Hip   Planes of Motion   Flexion: 5  Abduction: 4+  Adduction: 5  External rotation: 5    Left Knee   Flexion: 5  Extension: 5    Right Knee   Flexion: 5  Extension: 5        Exercise/therapeutic activity/NM re-ed:  -hip add iso, exhale with TA contraction 20x 5 sec  -bridge 2x10  -hip abd/ER with blue band, B and single leg x20  -piriformis stretch 3x20 sec  -Fig 4 3x20 sec  -SKTC 3x20 sec  -scap ret, green band 2x10  -shoulder ext, green band, 2x10  -cable standing lat pull down, 17.5lb, 2x10  -cable triceps  ext, 15lb 2x10  -shoulder punch out (in lunge stance) green band, 2x10  -B shoulder punch out (band origin at side of body) 2x10 each way  V/t/demo cues for core activation and exercises    Functional Outcome Score:   Oswestry Back Index=46%    Assessment & Plan       Assessment  Assessment details: Page Austin has been seen for 10 physical therapy sessions for LBP.  Treatment has included therapeutic exercise, manual therapy, therapeutic activity, neuro-muscular retraining , ultrasound, patient education with home exercise program , and dry needling . Progress to physical therapy goals is good. Pt has improved her pain complaints to no more than 2/10. She still has some sensation in the L calf at times and tightness and difficulty with prolonged sitting, lifting, and getting items from the floor.  She will benefit from continued skilled physical therapy to address remaining impairments and functional limitations.     Prognosis: good    Goals  Plan Goals: ST wks  1. Patient will be independent with education for symptom management, joint protection and strategies to minimize stress on affected tissues (MET)   2. Pt to improve pain complaints to no more than 4/10 with ADLs, sleeping, and sitting for work/driving (MET) pain no more than 2/10     LT wks  1. Pt to improve pain complaints to no more than 1/10 with ADLs, sitting at her desk, and sleeping (ONGOING)   2. Pt to improve trunk and LE strength by 1/2 to 1 MMT grade for greater ease with lifting groceries/laundry without increased pain (PART MET)   3. Pt to improve Oswestry Back index score from 50% to 12% for overall functional improvement with transfers and working out (ONGOING) improved to 46%  4. Pt to be independent with HEP for ROM, flexibility and core strength (ONGOING)       Plan  Therapy options: will be seen for skilled therapy services  Frequency: 2x week  Duration in weeks: 8  Treatment plan discussed with:  patient           Recommendations: Continue as planned  Timeframe: 2 months  Prognosis to achieve goals: good    PT Signature: Roxanne Muro PT, CDNT    License Number: [id number]    Electronically signed by Roxanne Muro PT, 07/02/24, 8:42 AM EDT      Based upon review of the patient's progress and continued therapy plan, it is my medical opinion that Page Austin should continue physical therapy treatment at [facility] PHYSICAL THERAPY  [address] [place] [address] [place]   [place] [address]  [phone].    Signature: __________________________________  Lidia Marroquin MD    Timed:  Manual Therapy:         mins  16131;  Therapeutic Exercise:    35     mins  67064;     Neuromuscular Kj:    10    mins  69010;    Therapeutic Activity:          mins  64679;     Gait Training:           mins  37806;     Ultrasound:          mins  85675;    Iontophoresis         mins 57159;    Untimed:  Electrical Stimulation:         mins  50381 ( );  Traction:         mins  90377;   Dry Needling   (1-2 muscles)            mins 61942 (Self-pay)  Dry Needling (3-4 muscles)             mins 20561 (Self-pay)  Dry Needling Trial                 mins DRYNDLTRIAL  (No Charge)    Timed Treatment:   45   mins   Total Treatment:     45   mins

## 2024-07-04 DIAGNOSIS — R00.2 PALPITATIONS: ICD-10-CM

## 2024-07-04 DIAGNOSIS — I10 BENIGN ESSENTIAL HYPERTENSION: ICD-10-CM

## 2024-07-04 NOTE — PROGRESS NOTES
Physical Therapy Daily Treatment Note    Patient: Page Austin   : 1990  Diagnosis/ICD-10 Code:  Acute left-sided low back pain with left-sided sciatica [M54.42]  Referring practitioner: Lidia Marroquin MD  Date of Initial Visit: Type: THERAPY  Noted: 2024  Today's Date: 2024  Patient seen for 11 sessions    Visit Diagnoses:    ICD-10-CM ICD-9-CM   1. Acute left-sided low back pain with left-sided sciatica  M54.42 724.2     724.3   2. Strain of lumbar region, subsequent encounter  S39.012D V58.89     847.2   3. Impaired functional mobility and activity tolerance  Z74.09 V49.89       [facility] [place] [address]  [phone] (phone)  [phone] (fax)         Subjective I had a hard time on Wed getting comfortable all day, sitting was difficult, tried pillows behind the back, sitting on one, standing at my raised desk. Yesterday am I sneezed and had pressure in the back, but it got better as the day when on. Still have some pain/sensation in the L outside calf    Objective     Exercise/therapeutic activity/NM re-ed:  -resting pelvis on  deflated Pilates ball 3 min  -hip add iso, exhale with TA contraction 20x 5 sec, on  deflated Pilates ball  -bridge 2x10  -hip abd/ER with blue band, B and single leg x20, on  deflated Pilates ball  -piriformis stretch 3x20 sec  -Fig 4 3x20 sec  -SKTC 3x20 sec  -scap ret, green band 2x10  -shoulder ext, green band, 2x10  -cable standing lat pull down, 17.5lb, 2x10  -cable triceps ext, 15lb 2x10  -shoulder punch out (in lunge stance) green band, 2x10  -B shoulder punch out (band origin at side of body) 2x10 each way  -touch and go squats x20  -Matrix back ext 30lb 2x10  V/t/demo cues for core activation and exercises    Assessment/Plan  Pt has had a little flare up of her back pain over the last few days. Wednesday she had a lot of difficulty with sitting at her desk and getting comfortable.  Yesterday was a little better, did sneeze and the pressure bothered her back some. She continues with discomfort in the L lateral calf that is pretty consistent. Core and LE strength improving.          Timed:    Manual Therapy:         mins  00882;  Therapeutic Exercise:    25     mins  65203;     Neuromuscular Kj:    10    mins  49173;    Therapeutic Activity:     10     mins  89488;     Gait Training:           mins  11490;     Ultrasound:          mins  03114;    Electrical Stimulation:         mins  88135 ( );  Iontophoresis         mins 05413;  Aquatic Therapy         mins 39252;      Untimed:  Electrical Stimulation:         mins  53259 ( );  Traction:         mins  63357;   Dry Needling   (1-2 muscles)             mins 20560 (Self-pay)  Dry Needling (3-4 muscles)           mins 20561 (Self-pay)  Dry Needling Trial              mins DRYNDLTRIAL  (No Charge)    Timed Treatment:   45   mins   Total Treatment:     45   mins    Roxanne Muro PT, CDNT  Physical Therapist    [place] License:[id number]

## 2024-07-05 ENCOUNTER — TREATMENT (OUTPATIENT)
Dept: PHYSICAL THERAPY | Facility: CLINIC | Age: 34
End: 2024-07-05
Payer: COMMERCIAL

## 2024-07-05 DIAGNOSIS — S39.012D STRAIN OF LUMBAR REGION, SUBSEQUENT ENCOUNTER: ICD-10-CM

## 2024-07-05 DIAGNOSIS — M54.42 ACUTE LEFT-SIDED LOW BACK PAIN WITH LEFT-SIDED SCIATICA: Primary | ICD-10-CM

## 2024-07-05 DIAGNOSIS — Z74.09 IMPAIRED FUNCTIONAL MOBILITY AND ACTIVITY TOLERANCE: ICD-10-CM

## 2024-07-05 RX ORDER — METOPROLOL SUCCINATE 50 MG/1
50 TABLET, EXTENDED RELEASE ORAL DAILY
Qty: 90 TABLET | Refills: 0 | Status: SHIPPED | OUTPATIENT
Start: 2024-07-05

## 2024-07-09 ENCOUNTER — TREATMENT (OUTPATIENT)
Dept: PHYSICAL THERAPY | Facility: CLINIC | Age: 34
End: 2024-07-09
Payer: COMMERCIAL

## 2024-07-09 DIAGNOSIS — M54.42 ACUTE LEFT-SIDED LOW BACK PAIN WITH LEFT-SIDED SCIATICA: Primary | ICD-10-CM

## 2024-07-09 DIAGNOSIS — M54.50 ACUTE LEFT-SIDED LOW BACK PAIN WITHOUT SCIATICA: ICD-10-CM

## 2024-07-09 DIAGNOSIS — S39.012D STRAIN OF LUMBAR REGION, SUBSEQUENT ENCOUNTER: ICD-10-CM

## 2024-07-09 DIAGNOSIS — Z74.09 IMPAIRED FUNCTIONAL MOBILITY AND ACTIVITY TOLERANCE: ICD-10-CM

## 2024-07-09 NOTE — PROGRESS NOTES
Physical Therapy Daily Treatment Note    Patient: Page Austin   : 1990  Diagnosis/ICD-10 Code:  Acute left-sided low back pain with left-sided sciatica [M54.42]  Referring practitioner: Lidia Marroquin MD  Date of Initial Visit: Type: THERAPY  Noted: 2024  Today's Date: 2024  Patient seen for 12 sessions    Visit Diagnoses:    ICD-10-CM ICD-9-CM   1. Acute left-sided low back pain with left-sided sciatica  M54.42 724.2     724.3   2. Strain of lumbar region, subsequent encounter  S39.012D V58.89     847.2   3. Impaired functional mobility and activity tolerance  Z74.09 V49.89   4. Acute left-sided low back pain without sciatica  M54.50 724.2       [facility] [place] [address]  [phone] (phone)  [phone] (fax)         Subjective I had a good weekend. We went and played mini golf (my hubby would pick my ball up for me) and then had dinner. My back was good.     Objective     Exercise/therapeutic activity/NM re-ed:  -resting pelvis on  deflated Pilates ball 3 min  -hip add iso, exhale with TA contraction 20x 5 sec, on  deflated Pilates ball  -bridge 2x10  -hip abd/ER with blue band, B and single leg x20, on  deflated Pilates ball  -supine march, exhale with TA contraction, 2x10  -piriformis stretch 3x20 sec  -Fig 4 3x20 sec  -SKTC 3x20 sec  -scap ret, green band 2x10  -shoulder ext, green band, 2x10  -cable standing lat pull down, 17.5lb, 2x10  -cable triceps ext, 15lb 2x10  -shoulder punch out (in lunge stance) green band, 2x10  -B shoulder punch out (band origin at side of body) 2x10 each way  -touch and go squats x20  DEFER  -Matrix back ext 30lb 2x10 DEFER  -4 inch fwd and lat step ups with hold, x10 on each  V/t/demo cues for core activation and exercises       Assessment/Plan  Pt is starting to tolerate increased activity and decreased pain with sitting. She will be going into her office tomorrow for only half of  the day, which increased her back pain last month. Continuing to progress her core exercises         Timed:    Manual Therapy:         mins  10058;  Therapeutic Exercise:    23     mins  97964;     Neuromuscular Kj:    10    mins  50561;    Therapeutic Activity:     10     mins  09874;     Gait Training:           mins  97568;     Ultrasound:          mins  89719;    Electrical Stimulation:         mins  27274 ( );  Iontophoresis         mins 66506;  Aquatic Therapy         mins 17115;      Untimed:  Electrical Stimulation:         mins  91740 ( );  Traction:         mins  18381;   Dry Needling   (1-2 muscles)             mins 20560 (Self-pay)  Dry Needling (3-4 muscles)           mins 20561 (Self-pay)  Dry Needling Trial              mins DRYNDLTRIAL  (No Charge)    Timed Treatment:   43   mins   Total Treatment:     43   mins    Roxanne Muro PT, CDNT  Physical Therapist    [place] License:[id number]

## 2024-07-12 ENCOUNTER — TREATMENT (OUTPATIENT)
Dept: PHYSICAL THERAPY | Facility: CLINIC | Age: 34
End: 2024-07-12
Payer: COMMERCIAL

## 2024-07-12 DIAGNOSIS — S39.012D STRAIN OF LUMBAR REGION, SUBSEQUENT ENCOUNTER: ICD-10-CM

## 2024-07-12 DIAGNOSIS — Z74.09 IMPAIRED FUNCTIONAL MOBILITY AND ACTIVITY TOLERANCE: ICD-10-CM

## 2024-07-12 DIAGNOSIS — M54.42 ACUTE LEFT-SIDED LOW BACK PAIN WITH LEFT-SIDED SCIATICA: Primary | ICD-10-CM

## 2024-07-12 NOTE — PROGRESS NOTES
Physical Therapy Daily Treatment Note    Patient: Page Austin   : 1990  Diagnosis/ICD-10 Code:  Acute left-sided low back pain with left-sided sciatica [M54.42]  Referring practitioner: Lidia Marroquin MD  Date of Initial Visit: Type: THERAPY  Noted: 2024  Today's Date: 2024  Patient seen for 13 sessions    Visit Diagnoses:    ICD-10-CM ICD-9-CM   1. Acute left-sided low back pain with left-sided sciatica  M54.42 724.2     724.3   2. Strain of lumbar region, subsequent encounter  S39.012D V58.89     847.2   3. Impaired functional mobility and activity tolerance  Z74.09 V49.89       [facility] [place] [address]  [phone] (phone)  [phone] (fax)         Subjective the back was a little sore after last time, but not painful.     Objective     Exercise/therapeutic activity/NM re-ed:  -resting pelvis on  deflated Pilates ball 3 min  -hip add iso, exhale with TA contraction 20x 5 sec, on  deflated Pilates ball  -bridge 2x10  -hip abd/ER with blue band, B and single leg x20, on  deflated Pilates ball  -supine march, exhale with TA contraction, 2x10  -piriformis stretch 3x20 sec  -Fig 4 3x20 sec  -SKTC 3x20 sec  -prone small lift alt hip ext over a pillow, x10, TA activation  -scap ret, green band 2x10  -shoulder ext, green band, 2x10  -cable standing lat pull down, 17.5lb, 2x10  -cable triceps ext, 15lb 2x10  -shoulder punch out (in lunge stance) green band, 2x10  -B shoulder punch out (band origin at side of body) 2x10 each way  -touch and go squats x20  DEFER  -Matrix back ext 30lb 2x10   -4 inch fwd and lat step ups with hold, x10 on each  V/t/demo cues for core activation and exercises       Assessment/Plan  Pt has been walking pretty much every day and exercising. She has improved her ability to sit, stand, and walk. She has occasional symptoms into the L LE, but mild. She has minimal to no back pain with SLR and  hamstring stretch on the L and none on the R. She still has some delayed activation of the L multifidus but it is improving.          Timed:    Manual Therapy:         mins  82215;  Therapeutic Exercise:    25     mins  88748;     Neuromuscular Kj:    10    mins  55811;    Therapeutic Activity:     10     mins  92505;     Gait Training:           mins  72723;     Ultrasound:          mins  93131;    Electrical Stimulation:         mins  86392 ( );  Iontophoresis         mins 85475;  Aquatic Therapy         mins 03734;      Untimed:  Electrical Stimulation:         mins  46212 ( );  Traction:         mins  16314;   Dry Needling   (1-2 muscles)             mins 20560 (Self-pay)  Dry Needling (3-4 muscles)           mins 20561 (Self-pay)  Dry Needling Trial              mins DRYNDLTRIAL  (No Charge)    Timed Treatment:   45   mins   Total Treatment:     45   mins    Roxanne Muro PT, CDNT  Physical Therapist    [place] License:[id number]

## 2024-07-23 ENCOUNTER — TREATMENT (OUTPATIENT)
Dept: PHYSICAL THERAPY | Facility: CLINIC | Age: 34
End: 2024-07-23
Payer: COMMERCIAL

## 2024-07-23 DIAGNOSIS — M54.42 ACUTE LEFT-SIDED LOW BACK PAIN WITH LEFT-SIDED SCIATICA: Primary | ICD-10-CM

## 2024-07-23 DIAGNOSIS — Z74.09 IMPAIRED FUNCTIONAL MOBILITY AND ACTIVITY TOLERANCE: ICD-10-CM

## 2024-07-23 DIAGNOSIS — S39.012D STRAIN OF LUMBAR REGION, SUBSEQUENT ENCOUNTER: ICD-10-CM

## 2024-07-23 NOTE — PROGRESS NOTES
Physical Therapy Daily Treatment Note    Patient: Page Austin   : 1990  Diagnosis/ICD-10 Code:  Acute left-sided low back pain with left-sided sciatica [M54.42]  Referring practitioner: Lidia Marroquin MD  Date of Initial Visit: Type: THERAPY  Noted: 2024  Today's Date: 2024  Patient seen for 14 sessions    Visit Diagnoses:    ICD-10-CM ICD-9-CM   1. Acute left-sided low back pain with left-sided sciatica  M54.42 724.2     724.3   2. Strain of lumbar region, subsequent encounter  S39.012D V58.89     847.2   3. Impaired functional mobility and activity tolerance  Z74.09 V49.89       [facility] [place] [address]  [phone] (phone)  [phone] (fax)         Subjective I had a great week last week. I even had to go into the office and was ok until the end of the day, stiffness in the LB    Objective     Exercise/therapeutic activity/NM re-ed:  -resting pelvis on  deflated Pilates ball 3 min  -hip add iso, exhale with TA contraction 20x 5 sec, on  deflated Pilates ball  -bridge 2x10  -hip abd/ER with blue band, B and single leg x20, on  deflated Pilates ball  -supine march, exhale with TA contraction, 2x10  -piriformis stretch 3x20 sec  -Fig 4 3x20 sec  -SKTC 3x20 sec  -prone small lift alt hip ext over a pillow, x10, TA activation  -scap ret, blue band 2x10  -shoulder ext, blue band, 2x10  -cable standing lat pull down, 17.5lb, 2x10 DEFER  -cable triceps ext, 15lb 2x10  DEFER  -shoulder punch out (in lunge stance) blue band, 2x10  -B shoulder punch out ( blueband origin at side of body) 2x10 each way  -touch and go squats x20  DEFER  -Matrix back ext 30lb 2x10   -4 inch fwd and lat step ups with hold, x10 on each DEFER  -air Dyne 5 min  V/t/demo cues for core activation and exercises        Assessment/Plan  Pt is starting to be more active with less pain. She is more tolerant of sitting at her office chair as well. She  would like to get back to doing her cardio on her bike, so started with 5 min on the [employer] with no pain. Pt ok to try her bike at home for 5 min         Timed:    Manual Therapy:         mins  55209;  Therapeutic Exercise:    25     mins  77601;     Neuromuscular Kj:    8    mins  15247;    Therapeutic Activity:     12     mins  71809;     Gait Training:           mins  25614;     Ultrasound:          mins  25707;    Electrical Stimulation:         mins  21558 ( );  Iontophoresis         mins 42235;  Aquatic Therapy         mins 22415;      Untimed:  Electrical Stimulation:         mins  89496 ( );  Traction:         mins  86556;   Dry Needling   (1-2 muscles)             mins 20560 (Self-pay)  Dry Needling (3-4 muscles)           mins 20561 (Self-pay)  Dry Needling Trial              mins DRYNDLTRIAL  (No Charge)    Timed Treatment:   45   mins   Total Treatment:     45   mins    Roxanne Muro, PT, CDNT  Physical Therapist    [place] License:[id number]

## 2024-07-26 ENCOUNTER — TREATMENT (OUTPATIENT)
Dept: PHYSICAL THERAPY | Facility: CLINIC | Age: 34
End: 2024-07-26
Payer: COMMERCIAL

## 2024-07-26 DIAGNOSIS — M54.50 ACUTE LEFT-SIDED LOW BACK PAIN WITHOUT SCIATICA: ICD-10-CM

## 2024-07-26 DIAGNOSIS — M54.42 ACUTE LEFT-SIDED LOW BACK PAIN WITH LEFT-SIDED SCIATICA: Primary | ICD-10-CM

## 2024-07-26 DIAGNOSIS — Z74.09 IMPAIRED FUNCTIONAL MOBILITY AND ACTIVITY TOLERANCE: ICD-10-CM

## 2024-07-26 DIAGNOSIS — S39.012D STRAIN OF LUMBAR REGION, SUBSEQUENT ENCOUNTER: ICD-10-CM

## 2024-07-26 NOTE — PROGRESS NOTES
Physical Therapy Daily Treatment Note    Patient: Page Austin   : 1990  Diagnosis/ICD-10 Code:  Acute left-sided low back pain with left-sided sciatica [M54.42]  Referring practitioner: Lidia Marroquin MD  Date of Initial Visit: Type: THERAPY  Noted: 2024  Today's Date: 2024  Patient seen for 15 sessions    Visit Diagnoses:    ICD-10-CM ICD-9-CM   1. Acute left-sided low back pain with left-sided sciatica  M54.42 724.2     724.3   2. Strain of lumbar region, subsequent encounter  S39.012D V58.89     847.2   3. Impaired functional mobility and activity tolerance  Z74.09 V49.89   4. Acute left-sided low back pain without sciatica  M54.50 724.2       [facility]  [address] [place] [address]  [phone] (phone)  [phone] (fax)         Subjective I had a little incident a couple days ago trying to put on my pants. I was standing and turned funny feeling something in the L SI, but it is fine now. Bothered me for a day or so.     Objective     Exercise/therapeutic activity/NM re-ed:  -resting pelvis on  deflated Pilates ball 3 min  -hip add iso, exhale with TA contraction 20x 5 sec, on  deflated Pilates ball  -bridge 2x10  -hip abd/ER with blue band, B and single leg x20, on  deflated Pilates ball  -supine march, exhale with TA contraction, 2x10  -piriformis stretch 3x20 sec  -Fig 4 3x20 sec  -SKTC 3x20 sec  -prone small lift alt hip ext over a pillow, x10, TA activation  -scap ret, blue band 2x10  -shoulder ext, blue band, 2x10  -cable standing lat pull down, 17.5lb, 2x10 DEFER  -cable triceps ext, 15lb 2x10  DEFER  -shoulder punch out (in lunge stance) blue band, 2x10 (cable 7.5lb)  -B shoulder punch out ( blueband origin at side of body) 2x10 each way (cable 7.5lb today)  -touch and go squats x20  DEFER  -Matrix back ext 30lb 2x10   -4 inch fwd and lat step ups with hold, x10 on each DEFER  -air Dyne 5 min  V/t/demo cues for core  activation and exercises         Assessment/Plan  Pt has been able to do 5 min on her bike at home without increased pain. Today, she exhibits a negative SLR for neural tension. She is starting to get back into more of her ADLs with minimal discomfort.          Timed:    Manual Therapy:         mins  76624;  Therapeutic Exercise:    25     mins  58106;     Neuromuscular Kj:    8    mins  28764;    Therapeutic Activity:     10     mins  27710;     Gait Training:           mins  58045;     Ultrasound:          mins  06801;    Electrical Stimulation:         mins  04767 ( );  Iontophoresis         mins 97489;  Aquatic Therapy         mins 52643;      Untimed:  Electrical Stimulation:         mins  00737 ( );  Traction:         mins  16427;   Dry Needling   (1-2 muscles)             mins 20560 (Self-pay)  Dry Needling (3-4 muscles)           mins 20561 (Self-pay)  Dry Needling Trial              mins DRYNDLTRIAL  (No Charge)    Timed Treatment:   43   mins   Total Treatment:     43   mins    Roxanne Muro PT, CDNT  Physical Therapist    [place] License:[id number]

## 2024-08-05 ENCOUNTER — TREATMENT (OUTPATIENT)
Dept: PHYSICAL THERAPY | Facility: CLINIC | Age: 34
End: 2024-08-05
Payer: COMMERCIAL

## 2024-08-05 DIAGNOSIS — Z74.09 IMPAIRED FUNCTIONAL MOBILITY AND ACTIVITY TOLERANCE: ICD-10-CM

## 2024-08-05 DIAGNOSIS — M54.42 ACUTE LEFT-SIDED LOW BACK PAIN WITH LEFT-SIDED SCIATICA: Primary | ICD-10-CM

## 2024-08-05 DIAGNOSIS — S39.012D STRAIN OF LUMBAR REGION, SUBSEQUENT ENCOUNTER: ICD-10-CM

## 2024-08-05 PROCEDURE — 97530 THERAPEUTIC ACTIVITIES: CPT | Performed by: PHYSICAL THERAPIST

## 2024-08-05 PROCEDURE — 97110 THERAPEUTIC EXERCISES: CPT | Performed by: PHYSICAL THERAPIST

## 2024-08-05 PROCEDURE — 97112 NEUROMUSCULAR REEDUCATION: CPT | Performed by: PHYSICAL THERAPIST

## 2024-08-05 NOTE — PROGRESS NOTES
60-Day / 10-Visit Progress Note         Patient: Page Austin   : 1990  Diagnosis/ICD-10 Code:  Acute left-sided low back pain with left-sided sciatica [M54.42]  Referring practitioner: Lidia Marroquin MD  Date of Initial Visit: Type: THERAPY  Noted: 2024  Today's Date: 2024  Patient seen for 16 sessions    Visit Diagnoses:    ICD-10-CM ICD-9-CM   1. Acute left-sided low back pain with left-sided sciatica  M54.42 724.2     724.3   2. Strain of lumbar region, subsequent encounter  S39.012D V58.89     847.2   3. Impaired functional mobility and activity tolerance  Z74.09 V49.89       [facility] [place] [address]  [phone] (phone)  [phone] (fax)    Subjective:     Clinical Progress: improved  Home Program Compliance: Yes  Treatment has included:  therapeutic exercise, manual therapy, therapeutic activity, neuro-muscular retraining , patient education with home exercise program , and dry needling     Subjective Pt was at a concert in [place] on Aug 1, spent the night at the hotel and came home. Took a break on the drive there, then stretched when I got to the hotel before the concert. I was up really late and didn't sleep well. I definitely felt painful and by Sat  was not happy. I was having some pain into the calf at times, feeling really stiff again and very cautious of movements. I have been walking and stretching, kind of back to my basic initial exercises.     Objective          Strength/Myotome Testing     Left Hip   Planes of Motion   Flexion: 5  Abduction: 4+  Adduction: 5  External rotation: 5    Right Hip   Planes of Motion   Flexion: 5  Abduction: 4+  Adduction: 5  External rotation: 5    Left Knee   Flexion: 5  Extension: 5    Right Knee   Flexion: 5  Extension: 5    Tests       Thoracic   Negative slump.     Lumbar     Left   Negative passive SLR.     Right   Negative passive SLR.         Exercise/therapeutic  activity/NM re-ed:  -resting pelvis on  deflated Pilates ball 3 min  -hip add iso, exhale with TA contraction 20x 5 sec, on  deflated Pilates ball  -bridge 2x10  -hip abd/ER with blue band, B and single leg x20, on 2 deflated Pilates ball  -supine march, exhale with TA contraction, 2x10  -piriformis stretch 3x20 sec  -Fig 4 3x20 sec  -SKTC 3x20 sec  -prone small lift alt hip ext over a pillow, x10, TA activation  -scap ret, blue band 2x10  -shoulder ext, blue band, 2x10  -cable standing lat pull down, 17.5lb, 2x10  -cable triceps ext, 15lb 2x10    -shoulder punch out (in lunge stance) blue band, 2x10 (cable 7.5lb)  -B shoulder punch out ( blueband origin at side of body) 2x10 each way (cable 7.5lb today)  DEFER  -touch and go squats x20  DEFER  -Matrix back ext 30lb 2x10   DEFER  -4 inch fwd and lat step ups with hold, x10 on each DEFER  -air Dyne 5 min  DEFER  V/t/demo cues for core activation and exercises   .     Functional Outcome Score:   Oswestry Back Index=30%    Assessment & Plan       Assessment  Assessment details: Page Austin has been seen for 16 physical therapy sessions for LBP with radiculopathy.  Treatment has included therapeutic exercise, manual therapy, therapeutic activity, neuro-muscular retraining , patient education with home exercise program , and dry needling . Progress to physical therapy goals is good. Page has greatly improved her pain and function over the last month. She is occasionally having some pain into the L calf after extended sitting or driving, but she is now neg for neural tension tests.  She will benefit from continued skilled physical therapy to address remaining impairments and functional limitations.     Prognosis: good    Goals  Plan Goals: ST wks  1. Patient will be independent with education for symptom management, joint protection and strategies to minimize stress on affected tissues (MET)   2. Pt to improve pain complaints to no more than 4/10 with  ADLs, sleeping, and sitting for work/driving (MET) pain no more than 2/10     LT wks  1. Pt to improve pain complaints to no more than 1/10 with ADLs, sitting at her desk, and sleeping (ONGOING)   2. Pt to improve trunk and LE strength by 1/2 to 1 MMT grade for greater ease with lifting groceries/laundry without increased pain (PART MET)   3. Pt to improve Oswestry Back index score from 50% to 12% for overall functional improvement with transfers and working out (ONGOING) improved to 30%  4. Pt to be independent with HEP for ROM, flexibility and core strength (ONGOING)       Plan  Therapy options: will be seen for skilled therapy services  Frequency: 2x week  Duration in weeks: 4  Treatment plan discussed with: patient           Recommendations: Continue as planned  Timeframe: 1 month  Prognosis to achieve goals: good    PT Signature: Roxanne Muro PT, CDNT    License Number: [id number]    Electronically signed by Roxanne Muro PT, 24, 3:02 PM EDT      Based upon review of the patient's progress and continued therapy plan, it is my medical opinion that Page Austin should continue physical therapy treatment at [facility]  [address] [place] [address] [address] [place] [address] [phone].    Signature: __________________________________  Lidia Marroquin MD    Timed:  Manual Therapy:         mins  19513;  Therapeutic Exercise:    25     mins  83539;     Neuromuscular Kj:    8    mins  26689;    Therapeutic Activity:     12     mins  57092;     Gait Training:           mins  08265;     Ultrasound:          mins  15466;    Iontophoresis         mins 55081;    Untimed:  Electrical Stimulation:         mins  54363 (MC );  Traction:         mins  24218;   Dry Needling   (1-2 muscles)            mins  (Self-pay)  Dry Needling (3-4 muscles)             mins  (Self-pay)  Dry Needling Trial                 mins DRYNDLTRIAL  (No Charge)    Timed  Treatment:   45   mins   Total Treatment:     45   mins

## 2024-08-16 ENCOUNTER — TREATMENT (OUTPATIENT)
Dept: PHYSICAL THERAPY | Facility: CLINIC | Age: 34
End: 2024-08-16
Payer: COMMERCIAL

## 2024-08-16 ENCOUNTER — OFFICE VISIT (OUTPATIENT)
Dept: FAMILY MEDICINE CLINIC | Facility: CLINIC | Age: 34
End: 2024-08-16
Payer: COMMERCIAL

## 2024-08-16 VITALS
DIASTOLIC BLOOD PRESSURE: 98 MMHG | SYSTOLIC BLOOD PRESSURE: 150 MMHG | BODY MASS INDEX: 30.39 KG/M2 | OXYGEN SATURATION: 98 % | RESPIRATION RATE: 16 BRPM | WEIGHT: 178 LBS | HEART RATE: 70 BPM | HEIGHT: 64 IN

## 2024-08-16 DIAGNOSIS — M54.16 LUMBAR RADICULOPATHY: ICD-10-CM

## 2024-08-16 DIAGNOSIS — R29.898 LEFT LEG WEAKNESS: Primary | ICD-10-CM

## 2024-08-16 DIAGNOSIS — S39.012D STRAIN OF LUMBAR REGION, SUBSEQUENT ENCOUNTER: ICD-10-CM

## 2024-08-16 DIAGNOSIS — M54.42 ACUTE LEFT-SIDED LOW BACK PAIN WITH LEFT-SIDED SCIATICA: Primary | ICD-10-CM

## 2024-08-16 DIAGNOSIS — Z74.09 IMPAIRED FUNCTIONAL MOBILITY AND ACTIVITY TOLERANCE: ICD-10-CM

## 2024-08-16 PROCEDURE — 99213 OFFICE O/P EST LOW 20 MIN: CPT | Performed by: FAMILY MEDICINE

## 2024-08-16 NOTE — PROGRESS NOTES
Physical Therapy Daily Treatment Note    Patient: Page Austin   : 1990  Diagnosis/ICD-10 Code:  Acute left-sided low back pain with left-sided sciatica [M54.42]  Referring practitioner: Lidia Marroquin MD  Date of Initial Visit: Type: THERAPY  Noted: 2024  Today's Date: 2024  Patient seen for 17 sessions    Visit Diagnoses:    ICD-10-CM ICD-9-CM   1. Acute left-sided low back pain with left-sided sciatica  M54.42 724.2     724.3   2. Strain of lumbar region, subsequent encounter  S39.012D V58.89     847.2   3. Impaired functional mobility and activity tolerance  Z74.09 V49.89       [facility] [place] [address]  [phone] (phone)  [phone] (fax)         Subjective it took me about a week to get back to where I was prior to the concert    Objective     Exercise/therapeutic activity/NM re-ed:  -resting pelvis on  deflated Pilates ball 3 min  -hip add iso, exhale with TA contraction 20x 5 sec, on  deflated Pilates ball  -bridge 2x10  -hip abd/ER with blue band, B and single leg x20, on  deflated Pilates ball  -supine march, exhale with TA contraction, 2x10 DEFER  -piriformis stretch 3x20 sec  -Fig 4 3x20 sec  -SKTC 3x20 sec  -prone small lift alt hip ext over a pillow, x10, TA activation DEFER  -cable standing lat pull down, 17.5lb, 2x10  -cable OH rope triceps ext, 15lb 2x10    -shoulder punch out (in lunge stance) blue band, 2x10 (cable 10lb)  -B shoulder punch out ( blueband origin at side of body) 2x10 each way (cable 7.5lb today)  DEFER  -touch and go squats x20  DEFER  -Matrix back ext 40lb 2x10   -4 inch fwd and lat step ups with hold, x10 on each DEFER  -air Dyne 5 min  DEFER  -squat to lift 10lb from weight bench x10  -single leg dead lift, 7.5lb x10 each LE with manual cues for decreasing rotation of the pelvis  V/t/demo cues for core activation and exercises   .      Discussed getting an MRI to see how much the  nerve root is compromised. She and her  are thinking about starting a family soon.    Assessment/Plan  Pt ws having pain for over a week after attending a concert. She was alternating her standing and sitting while there, but the time frame was too long. She is back to her baseline prior to the concert now. Working on building her core strength and starting into lifting mechanics today         Timed:    Manual Therapy:         mins  22138;  Therapeutic Exercise:    23     mins  65476;     Neuromuscular Kj:    13    mins  89796;    Therapeutic Activity:     8     mins  02196;     Gait Training:           mins  78257;     Ultrasound:          mins  38331;    Electrical Stimulation:         mins  90024 ( );  Iontophoresis         mins 37321;  Aquatic Therapy         mins 93337;      Untimed:  Electrical Stimulation:         mins  28030 ( );  Traction:         mins  42316;   Dry Needling   (1-2 muscles)             mins 86369 (Self-pay)  Dry Needling (3-4 muscles)           mins 20561 (Self-pay)  Dry Needling Trial              mins DRYNDLTRIAL  (No Charge)    Timed Treatment:   44   mins   Total Treatment:     44   mins    Roxanne Muro, PT, CDNT  Physical Therapist    [place] License:[id number]

## 2024-08-16 NOTE — PROGRESS NOTES
Orders Placed This Encounter   Procedures    MRI Lumbar Spine Without Contrast      Assessment & Plan  Left leg weakness    Lumbar radiculopathy    Patient was given instructions and counseling regarding her condition or for health maintenance advice. Please see specific information pulled into the AVS if appropriate.          Page is a 34 y.o. being seen today for  Back Pain (Low back center left down left leg )   HISTORY    Back Pain  This is a chronic problem. The current episode started more than 1 month ago. The problem occurs daily. The problem has been improving since onset. The pain is present in the sacro-iliac. The quality of the pain is described as burning, cramping and shooting. The pain radiates to the left buttock and left anterolateral lower leg. The pain is at a severity of 4/10. The pain is Worse during the day. The symptoms are aggravated by bending, coughing, position and twisting. Stiffness is present At night. Associated symptoms include leg pain and weakness. Pertinent negatives include no abdominal pain, bladder incontinence, bowel incontinence, chest pain, dysuria, fever, numbness, paresthesias, pelvic pain, perianal numbness, tingling or weight loss. Risk factors include recent trauma.   Additional comments: Initially injured April 28th, have seen chiropractor which did not help and then physical therapist since May 31st which is helping and ongoing    History of Present Illness  The patient is a 34-year-old female who presents for evaluation of back pain.    She experienced a fall during a squatting exercise in April 2024, which was discussed during her annual checkup on May 20, 2024. At that time, she was primarily seeking a refill for her blood pressure medication. Physical therapy (PT) was recommended and initiated on May 31, 2024, which has significantly improved her condition. However, she still experiences residual discomfort and occasional muscle weakness on her left  side.    In April 2024, she consulted a chiropractor who ruled out a disc-related issue. Her PT also initially dismissed a disc problem, suspecting an injured ligament or sacroiliac joint issue instead. She underwent dry needling and extensive stretching exercises. Initially, she was unable to stretch her hamstring or lift her leg, but these symptoms have since resolved. Her PT now suspects a possible disc injury.    She has a history of intermittent back problems over the past decade, which began with a back strain in her 20s. She is currently focusing on core strengthening and proper lifting techniques. She recalls an incident where she attended a concert a few weeks ago and experienced severe back pain for nine days afterwards, which took a long time to recover from.    She has been abstaining from sexual activity for several months due to her back pain and is certain she is not pregnant. She expresses concern about the potential impact of her back issues on future pregnancy plans. She is interested in exploring imaging options to further investigate her condition.  Social History  She  reports that she has never smoked. She has never used smokeless tobacco. She reports current alcohol use of about 2.0 standard drinks of alcohol per week. She reports that she does not use drugs.  EXAM DATA    Vital Signs        BP Readings from Last 1 Encounters:   08/16/24 150/98     Wt Readings from Last 3 Encounters:   08/16/24 80.7 kg (178 lb)   05/20/24 81.6 kg (180 lb)   01/08/24 81.6 kg (180 lb)   Body mass index is 30.55 kg/m².  Physical Exam  Vitals reviewed.   Constitutional:       Appearance: Normal appearance. She is well-developed.   Neurological:      Mental Status: She is alert.      Gait: Gait normal.   Psychiatric:         Behavior: Behavior normal.         Thought Content: Thought content normal.         Judgment: Judgment normal.             Patient or patient representative verbalized consent for the use of  Ambient Listening during the visit with  Lidia Marroquin MD for chart documentation. 8/16/2024  15:58 EDT

## 2024-08-20 ENCOUNTER — TREATMENT (OUTPATIENT)
Dept: PHYSICAL THERAPY | Facility: CLINIC | Age: 34
End: 2024-08-20
Payer: COMMERCIAL

## 2024-08-20 DIAGNOSIS — S39.012D STRAIN OF LUMBAR REGION, SUBSEQUENT ENCOUNTER: ICD-10-CM

## 2024-08-20 DIAGNOSIS — Z74.09 IMPAIRED FUNCTIONAL MOBILITY AND ACTIVITY TOLERANCE: ICD-10-CM

## 2024-08-20 DIAGNOSIS — M54.42 ACUTE LEFT-SIDED LOW BACK PAIN WITH LEFT-SIDED SCIATICA: Primary | ICD-10-CM

## 2024-08-20 NOTE — PROGRESS NOTES
Physical Therapy Daily Treatment Note    Patient: Page Austin   : 1990  Diagnosis/ICD-10 Code:  Acute left-sided low back pain with left-sided sciatica [M54.42]  Referring practitioner: Lidia Marroquin MD  Date of Initial Visit: Type: THERAPY  Noted: 2024  Today's Date: 2024  Patient seen for 18 sessions    Visit Diagnoses:    ICD-10-CM ICD-9-CM   1. Acute left-sided low back pain with left-sided sciatica  M54.42 724.2     724.3   2. Strain of lumbar region, subsequent encounter  S39.012D V58.89     847.2   3. Impaired functional mobility and activity tolerance  Z74.09 V49.89       [facility] [place] [address]  [phone] (phone)  [phone] (fax)         Subjective my hamstrings were pretty sore after the last session, eventually worked it out. Saw primary care and she is ordering an MRI    Objective     Exercise/therapeutic activity/NM re-ed:  -resting pelvis on  deflated Pilates ball 3 min  -hip add iso, exhale with TA contraction 20x 5 sec, on  deflated Pilates ball  -bridge 2x10  -hip abd/ER with blue band, B and single leg x20, on  deflated Pilates ball  -supine march, exhale with TA contraction, 2x10 DEFER  -piriformis stretch 3x20 sec  -Fig 4 3x20 sec  -prone small lift alt hip ext over a pillow, x10, TA activation DEFER  -cable standing lat pull down, 17.5lb, 2x10  -cable OH rope triceps ext, 15lb 2x10   -cable trunk rotation, 5lb x10 to each side (had some discomfort on the R with L rotation)  -shoulder punch out (in lunge stance) blue band, 2x10 (cable 10lb)  -B shoulder punch out ( blueband origin at side of body) 2x10 each way (cable 7.5lb today)  DEFER  -touch and go squats x20  DEFER  -Matrix back ext 40lb 2x10   -4 inch fwd and lat step ups with hold, x10 on each DEFER  -air Dyne 5 min  DEFER  -squat to lift 10lb from weight bench x10  -single leg dead lift, 7.5lb x10 each LE with manual cues for  decreasing rotation of the pelvis  -seated hamstring stretch, leg on bed, 3x20 sec  V/t/demo cues for core activation and exercises   .      Assessment/Plan  Pt was sore in the hamstrings from the last visit, adding the SL dead lift. Today we did it with a smaller ROM. She has a harder time stabilizing on the R LE and tends to rotate the pelvis to the L. Continue to work on core strength and stability.          Timed:    Manual Therapy:         mins  22286;  Therapeutic Exercise:    23     mins  06478;     Neuromuscular Kj:    10    mins  38796;    Therapeutic Activity:     12     mins  18576;     Gait Training:           mins  11966;     Ultrasound:          mins  41317;    Electrical Stimulation:         mins  09177 ( );  Iontophoresis         mins 18653;  Aquatic Therapy         mins 50780;      Untimed:  Electrical Stimulation:         mins  94007 ( );  Traction:         mins  32292;   Dry Needling   (1-2 muscles)             mins 46177 (Self-pay)  Dry Needling (3-4 muscles)           mins 20561 (Self-pay)  Dry Needling Trial              mins DRYNDLTRIAL  (No Charge)    Timed Treatment:   45   mins   Total Treatment:     45   mins    Roxanne Muro PT, CDNT  Physical Therapist    [place] License:[id number]

## 2024-08-29 ENCOUNTER — HOSPITAL ENCOUNTER (OUTPATIENT)
Dept: MRI IMAGING | Facility: HOSPITAL | Age: 34
Discharge: HOME | End: 2024-08-29
Admitting: FAMILY MEDICINE
Payer: COMMERCIAL

## 2024-08-29 DIAGNOSIS — R29.898 LEFT LEG WEAKNESS: ICD-10-CM

## 2024-08-29 DIAGNOSIS — M54.16 LUMBAR RADICULOPATHY: ICD-10-CM

## 2024-08-29 PROCEDURE — 72148 MRI LUMBAR SPINE W/O DYE: CPT

## 2024-08-30 ENCOUNTER — TREATMENT (OUTPATIENT)
Dept: PHYSICAL THERAPY | Facility: CLINIC | Age: 34
End: 2024-08-30
Payer: COMMERCIAL

## 2024-08-30 DIAGNOSIS — Z74.09 IMPAIRED FUNCTIONAL MOBILITY AND ACTIVITY TOLERANCE: ICD-10-CM

## 2024-08-30 DIAGNOSIS — M54.16 LUMBAR RADICULOPATHY: ICD-10-CM

## 2024-08-30 DIAGNOSIS — R29.898 LEFT LEG WEAKNESS: Primary | ICD-10-CM

## 2024-08-30 DIAGNOSIS — S39.012D STRAIN OF LUMBAR REGION, SUBSEQUENT ENCOUNTER: ICD-10-CM

## 2024-08-30 DIAGNOSIS — M54.42 ACUTE LEFT-SIDED LOW BACK PAIN WITH LEFT-SIDED SCIATICA: Primary | ICD-10-CM

## 2024-08-30 NOTE — PROGRESS NOTES
Physical Therapy Daily Treatment Note    Patient: Page Austin   : 1990  Diagnosis/ICD-10 Code:  Acute left-sided low back pain with left-sided sciatica [M54.42]  Referring practitioner: Lidia Marroquin MD  Date of Initial Visit: Type: THERAPY  Noted: 2024  Today's Date: 2024  Patient seen for 19 sessions    Visit Diagnoses:    ICD-10-CM ICD-9-CM   1. Acute left-sided low back pain with left-sided sciatica  M54.42 724.2     724.3   2. Strain of lumbar region, subsequent encounter  S39.012D V58.89     847.2   3. Impaired functional mobility and activity tolerance  Z74.09 V49.89       [facility] [place] [address]  [phone] (phone)  [phone] (fax)         Subjective  I had my MRI yesterday    Objective     Exercise/therapeutic activity/NM re-ed:  -resting pelvis on  deflated Pilates ball 3 min  -bridge 2x10  -hip abd/ER with blue band, B and single leg x20, on  deflated Pilates ball  -supine march, exhale with TA contraction, 2x10 DEFER  -piriformis stretch 3x20 sec  -Fig 4 3x20 sec  -prone small lift alt hip ext over a pillow, x10, TA activation DEFER  -cable standing lat pull down, 17.5lb, 2x10  -cable OH rope triceps ext, 15lb 2x10   -cable trunk rotation, 5lb x10 to each side (had some discomfort on the R with L rotation)  -shoulder punch out (in lunge stance) blue band, 2x10 (cable 10lb)  -B shoulder punch out ( blueband origin at side of body) 2x10 each way (cable 7.5lb today)  DEFER  -touch and go squats x20  DEFER  -Matrix back ext 40lb 2x10   -4 inch fwd and lat step ups with hold, x10 on each DEFER  -air Dyne 5 min  DEFER  -squat to lift 10lb from weight bench x10 DEFER  -single leg dead lift, 7.5lb x10 each LE with manual cues for decreasing rotation of the pelvis  -seated hamstring stretch, leg on bed, 3x20 sec  -lumbar stretch/decompress over big Swiss ball.   V/t/demo cues for core activation and exercises   .        Assessment/Plan  Pt got a kitten this week and has been a little sore from getting on the floor to play with her. She has started back to her stationary bike 3x a week, some muscle soreness but no back pain. She has more lumbar mobility now, able to PPT and APT easily. Plan to DC next session         Timed:    Manual Therapy:         mins  34292;  Therapeutic Exercise:    25     mins  05494;     Neuromuscular Kj:    8    mins  32675;    Therapeutic Activity:     12     mins  55225;     Gait Training:           mins  31006;     Ultrasound:          mins  55094;    Electrical Stimulation:         mins  65659 ( );  Iontophoresis         mins 67649;  Aquatic Therapy         mins 46800;      Untimed:  Electrical Stimulation:         mins  06209 ( );  Traction:         mins  23290;   Dry Needling   (1-2 muscles)             mins 20560 (Self-pay)  Dry Needling (3-4 muscles)           mins 20561 (Self-pay)  Dry Needling Trial              mins DRYNDLTRIAL  (No Charge)    Timed Treatment:   45   mins   Total Treatment:     45   mins    Roxanne Muro PT, CDNT  Physical Therapist    [place] License:[id number]

## 2024-09-06 ENCOUNTER — TREATMENT (OUTPATIENT)
Dept: PHYSICAL THERAPY | Facility: CLINIC | Age: 34
End: 2024-09-06
Payer: COMMERCIAL

## 2024-09-06 DIAGNOSIS — S39.012D STRAIN OF LUMBAR REGION, SUBSEQUENT ENCOUNTER: ICD-10-CM

## 2024-09-06 DIAGNOSIS — Z74.09 IMPAIRED FUNCTIONAL MOBILITY AND ACTIVITY TOLERANCE: ICD-10-CM

## 2024-09-06 DIAGNOSIS — M54.42 ACUTE LEFT-SIDED LOW BACK PAIN WITH LEFT-SIDED SCIATICA: Primary | ICD-10-CM

## 2024-09-06 NOTE — PROGRESS NOTES
30-Day / 10-Visit Progress Note/Discharge Summary         Patient: Page Austin   : 1990  Diagnosis/ICD-10 Code:  Acute left-sided low back pain with left-sided sciatica [M54.42]  Referring practitioner: Lidia Marroquin MD  Date of Initial Visit: Type: THERAPY  Noted: 2024  Today's Date: 2024  Patient seen for 20 sessions    Visit Diagnoses:    ICD-10-CM ICD-9-CM   1. Acute left-sided low back pain with left-sided sciatica  M54.42 724.2     724.3   2. Strain of lumbar region, subsequent encounter  S39.012D V58.89     847.2   3. Impaired functional mobility and activity tolerance  Z74.09 V49.89       [facility]  [address] [place] [address]  [phone] (phone)  [phone] (fax)    Subjective:     Clinical Progress: improved  Home Program Compliance: Yes  Treatment has included:  therapeutic exercise, manual therapy, therapeutic activity, neuro-muscular retraining , ultrasound, patient education with home exercise program , and dry needling     Subjective I have days where I don't hurt at all. Some prolonged activities can still be bothersome    Objective          Strength/Myotome Testing     Left Hip   Planes of Motion   Flexion: 5  Abduction: 5  Adduction: 5  External rotation: 5    Right Hip   Planes of Motion   Flexion: 5  Abduction: 5  Adduction: 5  External rotation: 5    Left Knee   Flexion: 5  Extension: 5    Right Knee   Flexion: 5  Extension: 5    Additional Strength Details  Core strength 4+/5    Muscle Activation     Additional Muscle Activation Details  Good activation of the TA and multifidus muscles, no lag felt on the L with multifidus        Exercise/therapeutic activity/NM re-ed:  -piriformis stretch 3x20 sec  -Fig 4 3x20 sec  -prone small lift alt hip ext over a pillow, x4, TA activation  -cable standing lat pull down, 17.5lb, 2x10  -cable OH rope triceps ext, 15lb 2x10   -cable trunk rotation, 5lb x10 to each side (had  some discomfort on the R with L rotation)  -shoulder punch out (in lunge stance) blue band, 2x10 (cable 10lb)  -Matrix back ext 40lb 2x10   -4 inch fwd and lat step ups with hold, x10 on each  -squat to lift 12.5lb from weight bench x10  -single leg dead lift, 7.5lb x10 each LE with manual cues for decreasing rotation of the pelvis  DEFER  -cable trunk rotation, 5lb, x10 to each side  -seated hamstring stretch, leg on bed, 3x20 sec  V/t/demo cues for core activation and exercises   .       Review of body mechanics with lifting, dos and don'ts with activities  Discussion of aquatic exercises (will mail HEP to patient)    Functional Outcome Score:   Oswestry Back Index=24%    Assessment & Plan       Assessment  Assessment details:  Page Austin was seen for 20 physical therapy sessions for LBP with radiculopathy.  Treatment included therapeutic exercise, manual therapy, therapeutic activity, neuro-muscular retraining , ultrasound, patient education with home exercise program , and dry needling . Progress to physical therapy goals was good. Pt is having days of no pain to 1/10. If she does more strenuous activities, her pain levels can increase to 2/10 without leg pain. Her LE and core strength have greatly improved and she has learned how to activate her TA and lumbar PVMs well.   She was discharged to an independent HEP and provided patient education to self-manage condition.         Goals  Plan Goals: ST wks  1. Patient will be independent with education for symptom management, joint protection and strategies to minimize stress on affected tissues (MET)   2. Pt to improve pain complaints to no more than 4/10 with ADLs, sleeping, and sitting for work/driving (MET) pain no more than 2/10     LT wks  1. Pt to improve pain complaints to no more than 1/10 with ADLs, sitting at her desk and sleeping (ONGOING) (PART MET) most days 0/10, can be a 2/10 at times  2. Pt to improve trunk and LE strength by 1/2 to 1  MMT grade for greater ease with lifting groceries/laundry without increased pain (MET)   3. Pt to improve Oswestry Back index score from 50% to 12% for overall functional improvement with transfers and working out (PART MET)  improved to 24% which is over the MDC of 10% improvement  4. Pt to be independent with HEP for ROM, flexibility and core strength (MET)       Plan  Treatment plan discussed with: patient           Recommendations: Discharge  Timeframe:  today  Prognosis to achieve goals: good    PT Signature: Roxanne Muro PT, CDNT    License Number: [id number]    Electronically signed by Roxanne Muro PT, 09/06/24, 12:45 PM EDT      Based upon review of the patient's progress and continued therapy plan, it is my medical opinion that Page Austin should continue physical therapy treatment at [facility]  [address] [place] [address]   [place] [address]  [phone].    Signature: __________________________________  Lidia Marroquin MD    Timed:  Manual Therapy:         mins  11759;  Therapeutic Exercise:  14     mins  43123;     Neuromuscular Kj:    8    mins  55548;    Therapeutic Activity:     23     mins  20248;     Gait Training:           mins  28847;     Ultrasound:          mins  33076;    Iontophoresis         mins 43357;    Untimed:  Electrical Stimulation:         mins  64948 (MC );  Traction:         mins  12182;   Dry Needling   (1-2 muscles)            mins 20560 (Self-pay)  Dry Needling (3-4 muscles)             mins 20561 (Self-pay)  Dry Needling Trial                 mins DRYNDLTRIAL  (No Charge)    Timed Treatment:   45   mins   Total Treatment:     45   mins

## 2024-09-26 NOTE — PROGRESS NOTES
"Date of visit: 9/30/2024   Patient ID: Page Austin  Age: 34 y.o.     Chief compliant:   Chief Complaint   Patient presents with    Extremity Weakness     NP       HPI:    The following portions of the patient's history were reviewed and updated as appropriate: allergies, current medications, past family history, past medical history, past social history, past surgical history and problem list.    Page Austin is a 34 y.o. female with history of hypertension who presents as a referral from PCP regarding back and leg pain.    She remembers a remote back issue lifting a coffee table over 10 years ago. She has had intermittent back pain since that time.  More recently, she was doing squats with dumbbells in April 2024. She felt something \"go\" in her whole body and new something was seriously off. It wasn't immediately severely painful, but built up. She was frozen up in pain in her lower extremities and couldn't function.  She was particularly having issues with range of motion.  She also had a mild, radiating, burning pain from the top of her buttox, into her hip, and back of the calf intermittently. Denies any numbness or weakness. No incontinence. She would have worsened pain with sitting for prolonged period, walking upstairs. She had significant improvement with PT from June-September including dry needling. She is now using a standing desk at work. She has felt much better the past couple weeks. She has intermittent episodes of back pain still. No neck issues or any other extremities.         Review of Systems   Musculoskeletal:  Positive for back pain. Negative for arthralgias.   Neurological:  Negative for dizziness, weakness and numbness.   Psychiatric/Behavioral:  Negative for behavioral problems and confusion.           Vitals:    09/30/24 0816   BP: 110/70   Pulse: 73   SpO2: 99%       Neurologic Exam     Mental Status   Oriented to person, place, and time.   Follows 3 step commands.   Attention: " normal. Concentration: normal.   Speech: speech is normal   Level of consciousness: alert  Knowledge: good.   Able to name object. Able to repeat. Normal comprehension.     Cranial Nerves     CN II   Visual fields full to confrontation.   Visual acuity: normal    CN III, IV, VI   Pupils are equal, round, and reactive to light.  Extraocular motions are normal.   Nystagmus: none     CN V   Facial sensation intact.     CN VII   Facial expression full, symmetric.     CN VIII   Hearing: intact (to conversation)    CN IX, X   CN IX normal.   CN X normal.   Palate: symmetric    CN XI   Right sternocleidomastoid strength: normal  Left sternocleidomastoid strength: normal  Right trapezius strength: normal  Left trapezius strength: normal    CN XII   CN XII normal.   Tongue deviation: none    Motor Exam   Muscle bulk: normal  Overall muscle tone: normal  Right arm pronator drift: absent  Left arm pronator drift: absent    Strength   Strength 5/5 except as noted.     Sensory Exam   Light touch normal.   Vibration normal.   Pinprick normal.     Gait, Coordination, and Reflexes     Gait  Gait: normal    Coordination   Romberg: negative  Finger to nose coordination: normal  Heel to shin coordination: normal    Tremor   Resting tremor: absent  Intention tremor: absent  Action tremor: absent    Reflexes   Right brachioradialis: 2+  Left brachioradialis: 2+  Right biceps: 2+  Left biceps: 2+  Right triceps: 2+  Left triceps: 2+  Right patellar: 2+  Left patellar: 2+  Right achilles: 2+  Left achilles: 2+  Right plantar: normal  Left plantar: normal  Right ankle clonus: absent  Left ankle clonus: absent        Imaging: Radiology report reviewed and study personally reviewed: MRI lumbar spine reviewed with patient noted for left paracentral disc bulge at L5/S1 level  Labs reviewed including CBC, CMP, TSH    Assessment/Plan:    Patient presents with concerns for acute onset back pain after squatting with radiation down the left  posterior leg into the calf.  MRI with left paracentral disc bulge with encroachment onto the S1 foramina.  Findings and symptoms consistent with S1 radiculopathy.  Patient has had significant improvement with physical therapy and dry needling.  Neurological examination is normal with intact reflexes, sensation, and no weakness.  Discussed possible referral to neurosurgery for further surgical opinion and establishment of care; however, patient will defer at this time due to improvement in her symptoms.  All questions answered to patient's satisfaction.     Diagnoses and all orders for this visit:    1. Lumbosacral radiculopathy at S1 (Primary)    -Follow-up as needed       Kevin Coelho MD    I spent 31 minutes caring for this patient on this date of service. This time includes time spent by me in the following activities as necessary: preparing for the visit, reviewing tests, medical records and previous visits, obtaining and/or reviewing a separately obtained history, performing a medically appropriate exam and/or evaluation, counseling and educating the patient, and/or communicating with other healthcare professionals, documenting information in the medical record, independently interpreting results and communicating that information with the patient, and developing a medically appropriate treatment plan with consideration of other conditions, medications, and treatments.

## 2024-09-30 ENCOUNTER — OFFICE VISIT (OUTPATIENT)
Dept: NEUROLOGY | Facility: CLINIC | Age: 34
End: 2024-09-30
Payer: COMMERCIAL

## 2024-09-30 VITALS
WEIGHT: 177.9 LBS | HEIGHT: 64 IN | OXYGEN SATURATION: 99 % | DIASTOLIC BLOOD PRESSURE: 70 MMHG | HEART RATE: 73 BPM | BODY MASS INDEX: 30.37 KG/M2 | SYSTOLIC BLOOD PRESSURE: 110 MMHG

## 2024-09-30 DIAGNOSIS — M54.17 LUMBOSACRAL RADICULOPATHY AT S1: Primary | ICD-10-CM

## 2024-09-30 PROCEDURE — 99203 OFFICE O/P NEW LOW 30 MIN: CPT | Performed by: PSYCHIATRY & NEUROLOGY

## 2024-09-30 NOTE — LETTER
"September 30, 2024       No Recipients    Patient: Page Austin   YOB: 1990   Date of Visit: 9/30/2024     Dear Lidia Marroquin MD:       Thank you for referring Page Austin to me for evaluation. Below are the relevant portions of my assessment and plan of care.    If you have questions, please do not hesitate to call me. I look forward to following Page along with you.         Sincerely,        Kevin Coelho MD        CC:   No Recipients    Kevin Coelho MD  09/30/24 0859  Sign when Signing Visit  Date of visit: 9/30/2024   Patient ID: Page Austin  Age: 34 y.o.     Chief compliant:   Chief Complaint   Patient presents with   • Extremity Weakness     NP       HPI:    The following portions of the patient's history were reviewed and updated as appropriate: allergies, current medications, past family history, past medical history, past social history, past surgical history and problem list.    Page Austin is a 34 y.o. female with history of hypertension who presents as a referral from PCP regarding back and leg pain.    She remembers a remote back issue lifting a coffee table over 10 years ago. She has had intermittent back pain since that time.  More recently, she was doing squats with dumbbells in April 2024. She felt something \"go\" in her whole body and new something was seriously off. It wasn't immediately severely painful, but built up. She was frozen up in pain in her lower extremities and couldn't function.  She was particularly having issues with range of motion.  She also had a mild, radiating, burning pain from the top of her buttox, into her hip, and back of the calf intermittently. Denies any numbness or weakness. No incontinence. She would have worsened pain with sitting for prolonged period, walking upstairs. She had significant improvement with PT from June-September including dry needling. She is now using a standing desk at work. She has felt much better the past " couple weeks. She has intermittent episodes of back pain still. No neck issues or any other extremities.         Review of Systems   Musculoskeletal:  Positive for back pain. Negative for arthralgias.   Neurological:  Negative for dizziness, weakness and numbness.   Psychiatric/Behavioral:  Negative for behavioral problems and confusion.           Vitals:    09/30/24 0816   BP: 110/70   Pulse: 73   SpO2: 99%       Neurologic Exam     Mental Status   Oriented to person, place, and time.   Follows 3 step commands.   Attention: normal. Concentration: normal.   Speech: speech is normal   Level of consciousness: alert  Knowledge: good.   Able to name object. Able to repeat. Normal comprehension.     Cranial Nerves     CN II   Visual fields full to confrontation.   Visual acuity: normal    CN III, IV, VI   Pupils are equal, round, and reactive to light.  Extraocular motions are normal.   Nystagmus: none     CN V   Facial sensation intact.     CN VII   Facial expression full, symmetric.     CN VIII   Hearing: intact (to conversation)    CN IX, X   CN IX normal.   CN X normal.   Palate: symmetric    CN XI   Right sternocleidomastoid strength: normal  Left sternocleidomastoid strength: normal  Right trapezius strength: normal  Left trapezius strength: normal    CN XII   CN XII normal.   Tongue deviation: none    Motor Exam   Muscle bulk: normal  Overall muscle tone: normal  Right arm pronator drift: absent  Left arm pronator drift: absent    Strength   Strength 5/5 except as noted.     Sensory Exam   Light touch normal.   Vibration normal.   Pinprick normal.     Gait, Coordination, and Reflexes     Gait  Gait: normal    Coordination   Romberg: negative  Finger to nose coordination: normal  Heel to shin coordination: normal    Tremor   Resting tremor: absent  Intention tremor: absent  Action tremor: absent    Reflexes   Right brachioradialis: 2+  Left brachioradialis: 2+  Right biceps: 2+  Left biceps: 2+  Right triceps:  2+  Left triceps: 2+  Right patellar: 2+  Left patellar: 2+  Right achilles: 2+  Left achilles: 2+  Right plantar: normal  Left plantar: normal  Right ankle clonus: absent  Left ankle clonus: absent        Imaging: Radiology report reviewed and study personally reviewed: MRI lumbar spine reviewed with patient noted for left paracentral disc bulge at L5/S1 level  Labs reviewed including CBC, CMP, TSH    Assessment/Plan:    Patient presents with concerns for acute onset back pain after squatting with radiation down the left posterior leg into the calf.  MRI with left paracentral disc bulge with encroachment onto the S1 foramina.  Findings and symptoms consistent with S1 radiculopathy.  Patient has had significant improvement with physical therapy and dry needling.  Neurological examination is normal with intact reflexes, sensation, and no weakness.  Discussed possible referral to neurosurgery for further surgical opinion and establishment of care; however, patient will defer at this time due to improvement in her symptoms.  All questions answered to patient's satisfaction.     Diagnoses and all orders for this visit:    1. Lumbosacral radiculopathy at S1 (Primary)    -Follow-up as needed       Kevin Coelho MD    I spent 31 minutes caring for this patient on this date of service. This time includes time spent by me in the following activities as necessary: preparing for the visit, reviewing tests, medical records and previous visits, obtaining and/or reviewing a separately obtained history, performing a medically appropriate exam and/or evaluation, counseling and educating the patient, and/or communicating with other healthcare professionals, documenting information in the medical record, independently interpreting results and communicating that information with the patient, and developing a medically appropriate treatment plan with consideration of other conditions, medications, and treatments.

## 2024-10-02 ENCOUNTER — PATIENT ROUNDING (BHMG ONLY) (OUTPATIENT)
Dept: NEUROLOGY | Facility: CLINIC | Age: 34
End: 2024-10-02
Payer: COMMERCIAL

## 2024-10-09 DIAGNOSIS — R00.2 PALPITATIONS: ICD-10-CM

## 2024-10-09 DIAGNOSIS — I10 BENIGN ESSENTIAL HYPERTENSION: ICD-10-CM

## 2024-10-09 RX ORDER — METOPROLOL SUCCINATE 50 MG/1
50 TABLET, EXTENDED RELEASE ORAL DAILY
Qty: 90 TABLET | Refills: 0 | Status: SHIPPED | OUTPATIENT
Start: 2024-10-09

## 2024-11-05 ENCOUNTER — FLU SHOT (OUTPATIENT)
Dept: FAMILY MEDICINE CLINIC | Facility: CLINIC | Age: 34
End: 2024-11-05
Payer: COMMERCIAL

## 2024-11-05 DIAGNOSIS — Z23 NEED FOR VACCINATION: Primary | ICD-10-CM

## 2024-11-05 PROCEDURE — 91320 SARSCV2 VAC 30MCG TRS-SUC IM: CPT | Performed by: FAMILY MEDICINE

## 2024-11-05 PROCEDURE — 90471 IMMUNIZATION ADMIN: CPT | Performed by: FAMILY MEDICINE

## 2024-11-05 PROCEDURE — 90656 IIV3 VACC NO PRSV 0.5 ML IM: CPT | Performed by: FAMILY MEDICINE

## 2024-11-05 PROCEDURE — 90480 ADMN SARSCOV2 VAC 1/ONLY CMP: CPT | Performed by: FAMILY MEDICINE

## 2025-01-15 DIAGNOSIS — I10 BENIGN ESSENTIAL HYPERTENSION: ICD-10-CM

## 2025-01-15 DIAGNOSIS — R00.2 PALPITATIONS: ICD-10-CM

## 2025-01-15 RX ORDER — METOPROLOL SUCCINATE 50 MG/1
50 TABLET, EXTENDED RELEASE ORAL DAILY
Qty: 90 TABLET | Refills: 0 | Status: SHIPPED | OUTPATIENT
Start: 2025-01-15

## 2025-04-11 ENCOUNTER — OFFICE VISIT (OUTPATIENT)
Dept: FAMILY MEDICINE CLINIC | Facility: CLINIC | Age: 35
End: 2025-04-11
Payer: COMMERCIAL

## 2025-04-11 VITALS
HEIGHT: 64 IN | BODY MASS INDEX: 29.88 KG/M2 | RESPIRATION RATE: 16 BRPM | OXYGEN SATURATION: 99 % | DIASTOLIC BLOOD PRESSURE: 78 MMHG | HEART RATE: 68 BPM | WEIGHT: 175 LBS | SYSTOLIC BLOOD PRESSURE: 110 MMHG

## 2025-04-11 DIAGNOSIS — R21 RASH: Primary | ICD-10-CM

## 2025-04-11 PROCEDURE — 99212 OFFICE O/P EST SF 10 MIN: CPT | Performed by: FAMILY MEDICINE

## 2025-04-11 NOTE — PROGRESS NOTES
Assessment & Plan  Rash.  The etiology of the rash remains uncertain. It is not itchy, which suggests it may not be a stress reaction. The rash appears as red, splotchy, and stingy, primarily affecting the upper arms and slightly above the elbows. It is not associated with extreme dry skin. She is advised to apply emollient creams prior to her next travel to see if it prevents the rash.             Patient was given instructions and counseling regarding her condition or for health maintenance advice. Please see specific information pulled into the AVS if appropriate.          Page is a 34 y.o. being seen today for  Rash (Bilateral arms after flying feels chapped )   HISTORY    Rash  This is a recurrent problem. The current episode started yesterday. The problem has been worsening since onset. The affected locations include the left arm and right arm. The rash is characterized by burning, dryness and redness. Pertinent negatives include no anorexia, congestion, cough, diarrhea, facial edema, fatigue, fever, joint pain, nail changes, rhinorrhea, shortness of breath, sore throat or vomiting.   Additional comments: This is second time I've had this, and both times were traveling/flying. Last time it was my hands, this time its my forearms.        The patient is a 34-year-old female who presents for evaluation of a rash.    She reports the development of a rash during her last two work-related travels. The first incident occurred in December 2024, when she visited [place]. During this trip, she experienced severe dryness and chapping on the backs of her hands, to the extent that even water contact caused cracking and redness. She speculated that the rash may have been a reaction to soap. Upon her return home, she applied a green ointment, which led to the resolution of the rash after a few days. The second incident occurred during a recent trip to [place]. She flew there on Tuesday and woke up on Wednesday  morning with a rash on both upper arms, extending slightly above the elbow. The rash was characterized by redness, stinging, and splotchiness, but without any sensation. She describes the skin as feeling chapped and questions if it could be an allergic reaction. She also wonders if it could be eczema or a reaction to extremely dry skin, although the skin does not feel dry. She has been using her own hand soap in hotels due to her dislike for fragranced products. She also washed her hands at the airport and office using different soaps. She notes that the rash was not itchy but more of a stinging and burning sensation. She speculates that the rash could be due to extreme dry skin. She also mentions that she was wearing short sleeves and is trying to identify any common factors between the two incidents. She applied CeraVe intensive lotion, which caused stinging.     Social History  She  reports that she has never smoked. She has never used smokeless tobacco. She reports current alcohol use of about 2.0 standard drinks of alcohol per week. She reports that she does not use drugs.  EXAM DATA    Vital Signs        BP Readings from Last 1 Encounters:   04/11/25 110/78     Wt Readings from Last 3 Encounters:   04/11/25 79.4 kg (175 lb)   09/30/24 80.7 kg (177 lb 14.4 oz)   08/16/24 80.7 kg (178 lb)   Body mass index is 30.04 kg/m².  Physical Exam  Vitals reviewed.   Constitutional:       Appearance: Normal appearance. She is well-developed.   Skin:     Findings: No rash.   Neurological:      Mental Status: She is alert.   Psychiatric:         Behavior: Behavior normal.         Thought Content: Thought content normal.         Judgment: Judgment normal.                       Patient or patient representative verbalized consent for the use of Ambient Listening during the visit with  Lidia Marroquin MD for chart documentation. 4/11/2025  09:43 EDT

## 2025-04-13 DIAGNOSIS — I10 BENIGN ESSENTIAL HYPERTENSION: ICD-10-CM

## 2025-04-13 DIAGNOSIS — R00.2 PALPITATIONS: ICD-10-CM

## 2025-04-14 RX ORDER — METOPROLOL SUCCINATE 50 MG/1
50 TABLET, EXTENDED RELEASE ORAL DAILY
Qty: 90 TABLET | Refills: 1 | Status: SHIPPED | OUTPATIENT
Start: 2025-04-14